# Patient Record
Sex: FEMALE | Race: WHITE | NOT HISPANIC OR LATINO | URBAN - METROPOLITAN AREA
[De-identification: names, ages, dates, MRNs, and addresses within clinical notes are randomized per-mention and may not be internally consistent; named-entity substitution may affect disease eponyms.]

---

## 2017-03-14 ENCOUNTER — APPOINTMENT (OUTPATIENT)
Dept: WOMENS IMAGING | Facility: HOSPITAL | Age: 62
End: 2017-03-14

## 2017-03-14 PROCEDURE — 77067 SCR MAMMO BI INCL CAD: CPT | Performed by: RADIOLOGY

## 2017-05-23 ENCOUNTER — ON CAMPUS - OUTPATIENT (AMBULATORY)
Dept: URBAN - METROPOLITAN AREA HOSPITAL 2 | Facility: HOSPITAL | Age: 62
End: 2017-05-23

## 2017-05-23 VITALS
DIASTOLIC BLOOD PRESSURE: 75 MMHG | SYSTOLIC BLOOD PRESSURE: 125 MMHG | DIASTOLIC BLOOD PRESSURE: 47 MMHG | HEART RATE: 82 BPM | HEART RATE: 81 BPM | SYSTOLIC BLOOD PRESSURE: 156 MMHG | SYSTOLIC BLOOD PRESSURE: 161 MMHG | DIASTOLIC BLOOD PRESSURE: 83 MMHG | OXYGEN SATURATION: 96 % | SYSTOLIC BLOOD PRESSURE: 131 MMHG | OXYGEN SATURATION: 99 % | DIASTOLIC BLOOD PRESSURE: 53 MMHG | HEART RATE: 89 BPM | DIASTOLIC BLOOD PRESSURE: 84 MMHG | HEART RATE: 90 BPM | HEART RATE: 80 BPM | HEIGHT: 65 IN | OXYGEN SATURATION: 97 % | RESPIRATION RATE: 16 BRPM | HEART RATE: 78 BPM | DIASTOLIC BLOOD PRESSURE: 108 MMHG | DIASTOLIC BLOOD PRESSURE: 89 MMHG | SYSTOLIC BLOOD PRESSURE: 70 MMHG | TEMPERATURE: 98.1 F | RESPIRATION RATE: 15 BRPM | WEIGHT: 164.8 LBS | OXYGEN SATURATION: 100 % | DIASTOLIC BLOOD PRESSURE: 90 MMHG | SYSTOLIC BLOOD PRESSURE: 143 MMHG | SYSTOLIC BLOOD PRESSURE: 129 MMHG | HEART RATE: 93 BPM

## 2017-05-23 DIAGNOSIS — Z86.010 PERSONAL HISTORY OF COLONIC POLYPS: ICD-10-CM

## 2017-05-23 PROCEDURE — 45378 DIAGNOSTIC COLONOSCOPY: CPT | Mod: 33

## 2017-05-23 RX ADMIN — PROPOFOL: 10 INJECTION, EMULSION INTRAVENOUS at 08:24

## 2018-03-15 ENCOUNTER — APPOINTMENT (OUTPATIENT)
Dept: WOMENS IMAGING | Facility: HOSPITAL | Age: 63
End: 2018-03-15

## 2018-03-15 PROCEDURE — 77067 SCR MAMMO BI INCL CAD: CPT | Performed by: RADIOLOGY

## 2018-03-15 PROCEDURE — 77063 BREAST TOMOSYNTHESIS BI: CPT | Performed by: RADIOLOGY

## 2023-03-28 ENCOUNTER — TRANSCRIBE ORDERS (OUTPATIENT)
Dept: ADMINISTRATIVE | Facility: HOSPITAL | Age: 68
End: 2023-03-28
Payer: COMMERCIAL

## 2023-03-28 DIAGNOSIS — Z13.6 ENCOUNTER FOR SCREENING FOR VASCULAR DISEASE: Primary | ICD-10-CM

## 2024-01-02 ENCOUNTER — APPOINTMENT (OUTPATIENT)
Dept: CT IMAGING | Facility: HOSPITAL | Age: 69
End: 2024-01-02

## 2024-01-02 ENCOUNTER — HOSPITAL ENCOUNTER (OUTPATIENT)
Dept: CARDIOLOGY | Facility: HOSPITAL | Age: 69
Discharge: HOME | End: 2024-01-02
Admitting: FAMILY MEDICINE

## 2024-01-02 DIAGNOSIS — Z13.6 ENCOUNTER FOR SCREENING FOR VASCULAR DISEASE: ICD-10-CM

## 2024-01-02 PROCEDURE — 93799 UNLISTED CV SVC/PROCEDURE: CPT

## 2024-01-05 LAB
BH CV VAS SCREENING CAROTID CCA LEFT: 59 CM/SEC
BH CV VAS SCREENING CAROTID CCA RIGHT: 50 CM/SEC
BH CV VAS SCREENING CAROTID ICA LEFT: 37 CM/SEC
BH CV VAS SCREENING CAROTID ICA RIGHT: 35 CM/SEC
BH CV XLRA MEAS - MID AO DIAM: 1.8 CM
BH CV XLRA MEAS - PAD LEFT ABI PT: 1.21
BH CV XLRA MEAS - PAD LEFT ARM: 118 MMHG
BH CV XLRA MEAS - PAD LEFT LEG PT: 149 MMHG
BH CV XLRA MEAS - PAD RIGHT ABI PT: 1.23
BH CV XLRA MEAS - PAD RIGHT ARM: 123 MMHG
BH CV XLRA MEAS - PAD RIGHT LEG PT: 151 MMHG
BH CV XLRA MEAS LEFT DIST CCA EDV: -23.8 CM/SEC
BH CV XLRA MEAS LEFT DIST CCA PSV: -58.6 CM/SEC
BH CV XLRA MEAS LEFT ICA/CCA RATIO: 0.6
BH CV XLRA MEAS LEFT PROX ICA EDV: -17.2 CM/SEC
BH CV XLRA MEAS LEFT PROX ICA PSV: -36.7 CM/SEC
BH CV XLRA MEAS RIGHT DIST CCA EDV: -19.9 CM/SEC
BH CV XLRA MEAS RIGHT DIST CCA PSV: -50.3 CM/SEC
BH CV XLRA MEAS RIGHT ICA/CCA RATIO: 0.7
BH CV XLRA MEAS RIGHT PROX ICA EDV: -15.7 CM/SEC
BH CV XLRA MEAS RIGHT PROX ICA PSV: -35.4 CM/SEC

## 2024-01-29 ENCOUNTER — HOSPITAL ENCOUNTER (OUTPATIENT)
Dept: CT IMAGING | Facility: HOSPITAL | Age: 69
Discharge: HOME | End: 2024-01-29
Admitting: FAMILY MEDICINE

## 2024-01-29 DIAGNOSIS — Z13.6 ENCOUNTER FOR SCREENING FOR VASCULAR DISEASE: ICD-10-CM

## 2024-01-29 PROCEDURE — 75571 CT HRT W/O DYE W/CA TEST: CPT

## 2024-01-31 RX ORDER — ESCITALOPRAM OXALATE 20 MG/1
20 TABLET ORAL DAILY
COMMUNITY

## 2024-01-31 RX ORDER — SPIRONOLACTONE 50 MG/1
TABLET, FILM COATED ORAL
COMMUNITY
Start: 2024-01-07

## 2024-01-31 NOTE — SIGNIFICANT NOTE
Education provided the Patient on the following:    - Nothing to Eat or Drink after MN the night before the procedure  -You will need to have someone drive you home after your Surgery and remain with you for 24 hours after the Procedure  - The date of your procedure, your are welcome to have one visitor at bedside or remain within 10-15 minutes of [facility]  -Please wear warm socks when you arrive for your Surgery  -Remove all jewelry and leave any valuables before arriving on the date of your procedure (all will have to be removed before leaving preop)  -You will need to arrive at 0600 on 2/5 for your Surgery  -Feel free to contact us at: [phone] with any additional questions/concerns

## 2024-02-05 ENCOUNTER — ANESTHESIA EVENT (OUTPATIENT)
Dept: SURGERY | Facility: SURGERY CENTER | Age: 69
End: 2024-02-05
Payer: MEDICARE

## 2024-02-05 ENCOUNTER — HOSPITAL ENCOUNTER (OUTPATIENT)
Facility: SURGERY CENTER | Age: 69
Setting detail: HOSPITAL OUTPATIENT SURGERY
Discharge: HOME | End: 2024-02-05
Attending: PODIATRIST | Admitting: PODIATRIST
Payer: MEDICARE

## 2024-02-05 ENCOUNTER — ANESTHESIA (OUTPATIENT)
Dept: SURGERY | Facility: SURGERY CENTER | Age: 69
End: 2024-02-05
Payer: MEDICARE

## 2024-02-05 VITALS
HEART RATE: 68 BPM | OXYGEN SATURATION: 96 % | RESPIRATION RATE: 16 BRPM | SYSTOLIC BLOOD PRESSURE: 115 MMHG | DIASTOLIC BLOOD PRESSURE: 88 MMHG | TEMPERATURE: 98 F | BODY MASS INDEX: 27.66 KG/M2 | WEIGHT: 166 LBS | HEIGHT: 65 IN

## 2024-02-05 PROCEDURE — 25010000002 MIDAZOLAM PER 1 MG: Performed by: ANESTHESIOLOGY

## 2024-02-05 PROCEDURE — 28289 CORRJ HALUX RIGDUS W/O IMPLT: CPT | Performed by: PODIATRIST

## 2024-02-05 PROCEDURE — 25010000002 DEXAMETHASONE SODIUM PHOSPHATE 20 MG/5ML SOLUTION: Performed by: ANESTHESIOLOGY

## 2024-02-05 PROCEDURE — 25010000002 CEFAZOLIN SODIUM-DEXTROSE 1-4 GM-%(50ML) RECONSTITUTED SOLUTION: Performed by: ANESTHESIOLOGY

## 2024-02-05 PROCEDURE — 25010000002 PROPOFOL 10 MG/ML EMULSION: Performed by: ANESTHESIOLOGY

## 2024-02-05 PROCEDURE — 25010000002 PROPOFOL 200 MG/20ML EMULSION: Performed by: ANESTHESIOLOGY

## 2024-02-05 PROCEDURE — 25010000002 FENTANYL CITRATE (PF) 50 MCG/ML SOLUTION: Performed by: ANESTHESIOLOGY

## 2024-02-05 PROCEDURE — 25010000002 BUPIVACAINE 0.5 % SOLUTION: Performed by: PODIATRIST

## 2024-02-05 PROCEDURE — 25810000003 LACTATED RINGERS PER 1000 ML: Performed by: ANESTHESIOLOGY

## 2024-02-05 RX ORDER — MAGNESIUM 30 MG
30 TABLET ORAL
COMMUNITY

## 2024-02-05 RX ORDER — HYDRALAZINE HYDROCHLORIDE 20 MG/ML
10 INJECTION INTRAMUSCULAR; INTRAVENOUS
Status: DISCONTINUED | OUTPATIENT
Start: 2024-02-05 | End: 2024-02-05 | Stop reason: HOSPADM

## 2024-02-05 RX ORDER — CEFAZOLIN SODIUM 1 G/50ML
1000 SOLUTION INTRAVENOUS ONCE
Status: CANCELLED | OUTPATIENT
Start: 2024-02-05

## 2024-02-05 RX ORDER — FAMOTIDINE 10 MG/ML
20 INJECTION, SOLUTION INTRAVENOUS ONCE
Status: COMPLETED | OUTPATIENT
Start: 2024-02-05 | End: 2024-02-05

## 2024-02-05 RX ORDER — FENTANYL CITRATE 50 UG/ML
25 INJECTION, SOLUTION INTRAMUSCULAR; INTRAVENOUS
Status: DISCONTINUED | OUTPATIENT
Start: 2024-02-05 | End: 2024-02-05 | Stop reason: HOSPADM

## 2024-02-05 RX ORDER — DEXAMETHASONE SODIUM PHOSPHATE 4 MG/ML
INJECTION, SOLUTION INTRA-ARTICULAR; INTRALESIONAL; INTRAMUSCULAR; INTRAVENOUS; SOFT TISSUE AS NEEDED
Status: DISCONTINUED | OUTPATIENT
Start: 2024-02-05 | End: 2024-02-05 | Stop reason: SURG

## 2024-02-05 RX ORDER — LABETALOL HYDROCHLORIDE 5 MG/ML
5 INJECTION, SOLUTION INTRAVENOUS
Status: DISCONTINUED | OUTPATIENT
Start: 2024-02-05 | End: 2024-02-05 | Stop reason: HOSPADM

## 2024-02-05 RX ORDER — SODIUM CHLORIDE 0.9 % (FLUSH) 0.9 %
10 SYRINGE (ML) INJECTION AS NEEDED
Status: DISCONTINUED | OUTPATIENT
Start: 2024-02-05 | End: 2024-02-05 | Stop reason: HOSPADM

## 2024-02-05 RX ORDER — PROPOFOL 10 MG/ML
INJECTION, EMULSION INTRAVENOUS AS NEEDED
Status: DISCONTINUED | OUTPATIENT
Start: 2024-02-05 | End: 2024-02-05 | Stop reason: SURG

## 2024-02-05 RX ORDER — LIDOCAINE HYDROCHLORIDE 20 MG/ML
INJECTION, SOLUTION EPIDURAL; INFILTRATION; INTRACAUDAL; PERINEURAL AS NEEDED
Status: DISCONTINUED | OUTPATIENT
Start: 2024-02-05 | End: 2024-02-05 | Stop reason: SURG

## 2024-02-05 RX ORDER — SULFACETAMIDE SODIUM 100 MG/ML
SOLUTION/ DROPS OPHTHALMIC
COMMUNITY
Start: 2016-03-02

## 2024-02-05 RX ORDER — MAGNESIUM HYDROXIDE 1200 MG/15ML
LIQUID ORAL AS NEEDED
Status: DISCONTINUED | OUTPATIENT
Start: 2024-02-05 | End: 2024-02-05 | Stop reason: HOSPADM

## 2024-02-05 RX ORDER — ONDANSETRON 2 MG/ML
4 INJECTION INTRAMUSCULAR; INTRAVENOUS ONCE AS NEEDED
Status: DISCONTINUED | OUTPATIENT
Start: 2024-02-05 | End: 2024-02-05 | Stop reason: HOSPADM

## 2024-02-05 RX ORDER — FENTANYL CITRATE 0.05 MG/ML
INJECTION, SOLUTION INTRAMUSCULAR; INTRAVENOUS AS NEEDED
Status: DISCONTINUED | OUTPATIENT
Start: 2024-02-05 | End: 2024-02-05 | Stop reason: SURG

## 2024-02-05 RX ORDER — ACETAMINOPHEN 500 MG
1000 TABLET ORAL ONCE
Status: COMPLETED | OUTPATIENT
Start: 2024-02-05 | End: 2024-02-05

## 2024-02-05 RX ORDER — BUPIVACAINE HYDROCHLORIDE 5 MG/ML
INJECTION, SOLUTION PERINEURAL AS NEEDED
Status: DISCONTINUED | OUTPATIENT
Start: 2024-02-05 | End: 2024-02-05 | Stop reason: HOSPADM

## 2024-02-05 RX ORDER — SODIUM CHLORIDE 0.9 % (FLUSH) 0.9 %
10 SYRINGE (ML) INJECTION EVERY 12 HOURS SCHEDULED
Status: DISCONTINUED | OUTPATIENT
Start: 2024-02-05 | End: 2024-02-05 | Stop reason: HOSPADM

## 2024-02-05 RX ORDER — MIDAZOLAM HYDROCHLORIDE 1 MG/ML
1 INJECTION INTRAMUSCULAR; INTRAVENOUS ONCE
Status: COMPLETED | OUTPATIENT
Start: 2024-02-05 | End: 2024-02-05

## 2024-02-05 RX ORDER — CEFAZOLIN SODIUM 1 G/50ML
SOLUTION INTRAVENOUS AS NEEDED
Status: DISCONTINUED | OUTPATIENT
Start: 2024-02-05 | End: 2024-02-05 | Stop reason: SURG

## 2024-02-05 RX ORDER — DICLOFENAC SODIUM 75 MG/1
75 TABLET, DELAYED RELEASE ORAL
COMMUNITY
Start: 2023-11-15

## 2024-02-05 RX ORDER — SODIUM CHLORIDE, SODIUM LACTATE, POTASSIUM CHLORIDE, CALCIUM CHLORIDE 600; 310; 30; 20 MG/100ML; MG/100ML; MG/100ML; MG/100ML
9 INJECTION, SOLUTION INTRAVENOUS CONTINUOUS PRN
Status: DISCONTINUED | OUTPATIENT
Start: 2024-02-05 | End: 2024-02-05 | Stop reason: HOSPADM

## 2024-02-05 RX ADMIN — ACETAMINOPHEN 1000 MG: 500 TABLET ORAL at 07:12

## 2024-02-05 RX ADMIN — LIDOCAINE HYDROCHLORIDE 60 MG: 20 INJECTION, SOLUTION EPIDURAL; INFILTRATION; INTRACAUDAL; PERINEURAL at 07:38

## 2024-02-05 RX ADMIN — FAMOTIDINE 20 MG: 10 INJECTION, SOLUTION INTRAVENOUS at 07:12

## 2024-02-05 RX ADMIN — SODIUM CHLORIDE, POTASSIUM CHLORIDE, SODIUM LACTATE AND CALCIUM CHLORIDE 9 ML/HR: 600; 310; 30; 20 INJECTION, SOLUTION INTRAVENOUS at 07:03

## 2024-02-05 RX ADMIN — PROPOFOL 100 MG: 10 INJECTION, EMULSION INTRAVENOUS at 07:38

## 2024-02-05 RX ADMIN — PROPOFOL 120 MCG/KG/MIN: 10 INJECTION, EMULSION INTRAVENOUS at 07:38

## 2024-02-05 RX ADMIN — CEFAZOLIN SODIUM 1 G: 1 SOLUTION INTRAVENOUS at 07:37

## 2024-02-05 RX ADMIN — DEXAMETHASONE SODIUM PHOSPHATE 8 MG: 4 INJECTION, SOLUTION INTRAMUSCULAR; INTRAVENOUS at 07:52

## 2024-02-05 RX ADMIN — FENTANYL CITRATE 50 MCG: 0.05 INJECTION, SOLUTION INTRAMUSCULAR; INTRAVENOUS at 07:36

## 2024-02-05 RX ADMIN — MIDAZOLAM 1 MG: 1 INJECTION INTRAMUSCULAR; INTRAVENOUS at 07:12

## 2024-02-05 NOTE — H&P
Patient Care Team:  Ginny Parker MD as PCP - General (Family Medicine)  Emi Birmingham MD as Consulting Physician (Obstetrics and Gynecology)    Chief complaint pain 1st MPJ L    Subjective     Patient is a 68 y.o. female presents with Pain with decreased ROM L 1st MPJ.     Review of Systems   Review of Systems   Constitutional: Negative.    HENT: Negative.     Eyes: Negative.    Respiratory: Negative.     Cardiovascular: Negative.    Gastrointestinal: Negative.    Endocrine: Negative.    Genitourinary: Negative.    Musculoskeletal: Negative.    Skin: Negative.    Allergic/Immunologic: Negative.    Neurological: Negative.    Hematological: Negative.    Psychiatric/Behavioral: Negative.         History  Past Medical History:   Diagnosis Date    Arthritis     Cancer     Sleep apnea      Past Surgical History:   Procedure Laterality Date    BILATERAL BREAST REDUCTION Bilateral     CHOLECYSTECTOMY      HYSTERECTOMY      SKIN CANCER EXCISION      on face    TONSILLECTOMY      TUBAL ABDOMINAL LIGATION       History reviewed. No pertinent family history.  Social History     Tobacco Use    Smoking status: Former     Packs/day: 1.00     Years: 12.00     Additional pack years: 0.00     Total pack years: 12.00     Types: Cigarettes     Quit date:      Years since quittin.1   Substance Use Topics    Alcohol use: Yes     Comment: socially     E-cigarette/Vaping     E-cigarette/Vaping Substances     Medications Prior to Admission   Medication Sig Dispense Refill Last Dose    diclofenac (VOLTAREN) 75 MG EC tablet 1 tablet.   2024    docusate sodium (COLACE) 50 MG capsule COLACE CAPS   Past Week    escitalopram (LEXAPRO) 20 MG tablet Take 1 tablet by mouth Daily.   2024    magnesium 30 MG tablet Take 1 tablet by mouth.   2024    spironolactone (ALDACTONE) 50 MG tablet TAKE 1 TABLET DAILY, IF WELL TOLERATED AFTER 2 WEEKS INCREASE TO 2 TABLETS DAILY   2024    sulfacetamide (Bleph-10) 10 %  ophthalmic solution BLEPH-10 10 % SOLN   2/5/2024     Allergies:  Codeine    Objective     Vital Signs  Temp:  [97.5 °F (36.4 °C)] 97.5 °F (36.4 °C)  Heart Rate:  [71] 71  Resp:  [16] 16  BP: (137)/(66) 137/66    Physical Exam:    Physical Exam  HENT:      Head: Normocephalic and atraumatic.   Cardiovascular:      Rate and Rhythm: Normal rate and regular rhythm.   Pulmonary:      Effort: Pulmonary effort is normal.      Breath sounds: Normal breath sounds.   Musculoskeletal:         General: Normal range of motion.      Cervical back: Normal range of motion and neck supple.   Feet:      Comments: Pain with decresae ROM L 1st MPJ  Skin:     General: Skin is warm and dry.      Capillary Refill: Capillary refill takes less than 2 seconds.   Neurological:      General: No focal deficit present.      Mental Status: She is alert and oriented to person, place, and time.       Results Review:   I reviewed the patient's new clinical results.  I reviewed the patient's new imaging results and agree with the interpretation.  I reviewed the patient's other test results and agree with the interpretation    Assessment & Plan       * No active hospital problems. *      **    I discussed the patients findings and my recommendations with patient.     Yann Troncoso DPM  02/05/24  07:34 EST

## 2024-02-05 NOTE — OP NOTE
CHEILECTOMY  Procedure Report    Patient Name:  Sue Chen  YOB: 1955    Date of Surgery:  2/5/2024     Indications:  Pain with ambulation    Pre-op Diagnosis:   Hallux rigidus of left foot [M20.22]       Post-Op Diagnosis Codes:     * Hallux rigidus of left foot [M20.22]    Procedure/CPT® Codes:      Procedure(s):  HALLUX RIGIDUS CORRECTION LEFT    Staff:  Surgeon(s):  Yann Troncoso DPM         Anesthesia: MAC with Local    Estimated Blood Loss: Minimal    Implants:    Nothing was implanted during the procedure    Specimen:          None        Findings: See Op report    Complications: None    Description of Procedure: The patient was brought into the operating room and placed on the operating room table in a supine position the patient was then placed under monitored anesthesia. A local block consisting of 20cc of a 1 to 1 mixture of 1% lidocaine plain and 0.5% Marcaine plain was then locally infiltrated.   The foot was then scrubbed prepped and draped in the usual aseptic manner. The foot was exsanguinated and the pneumatic ankle tourniquet was then inflated. Our attention was then directed to the left foot where a 5 cm dorsal linear incision was created over the first MPJ. The incision was then deepened through the subcutaneous tissue all vital neural and vascular structures were retractedm, all bleeders were cauterized and ligated as necessary. A linear type capsulotomy was then performed. The head of the first metatarsal was then exposed. The medial, dorsal and lateral spurring were then resected using a sagittal bone saw. The ROM was assesed and no further impingement was noted.  The area was then flushed with copious amounts of normal Saline solution. The capsule was then repaired with 3-0 Vicryl the skin was repaired with 5-0 proline. The area was then dressed with a dry sterile dressing. The pneumatic ankle tourniquet was released and a promptVascular response was noted in the digits.  Patient will be allowed to partial weight bear with a postoperative shoe and follow up with myself in the office in one week.          Yann Troncoso DPM     Date: 2/5/2024  Time: 07:35 EST

## 2024-02-05 NOTE — ANESTHESIA PREPROCEDURE EVALUATION
Anesthesia Evaluation     no history of anesthetic complications:   NPO Solid Status: > 8 hours  NPO Liquid Status: > 2 hours           Airway   Mallampati: II  Neck ROM: full  no difficulty expected  Dental - normal exam     Pulmonary - normal exam   (+) a smoker Former,sleep apnea  (-) COPD, asthma    PE comment: nonlabored  Cardiovascular - negative cardio ROS and normal exam  Exercise tolerance: good (4-7 METS)    Rhythm: regular  Rate: normal    (-) hypertension, valvular problems/murmurs, past MI, CAD, dysrhythmias, angina      Neuro/Psych- negative ROS  (-) seizures, TIA, CVA  GI/Hepatic/Renal/Endo - negative ROS   (-) GERD, liver disease, no renal disease, diabetes, no thyroid disorder    Musculoskeletal     (+) arthralgias  Abdominal    Substance History      OB/GYN          Other   arthritis,   history of cancer (Skin cancer)                Anesthesia Plan    ASA 2     MAC       Anesthetic plan, risks, benefits, and alternatives have been provided, discussed and informed consent has been obtained with: patient.    CODE STATUS:

## 2024-02-05 NOTE — ANESTHESIA POSTPROCEDURE EVALUATION
"Patient: Sue Chen    Procedure Summary       Date: 02/05/24 Room / Location: [facility]    Anesthesia Start: 0734 Anesthesia Stop: 0809    Procedure: HALLUX RIGIDUS CORRECTION LEFT (Left) Diagnosis:       Hallux rigidus of left foot      (Hallux rigidus of left foot [M20.22])    Surgeons: Yann Troncoso DPM Provider: Glenn Braga MD    Anesthesia Type: MAC ASA Status: 2            Anesthesia Type: MAC    Vitals  Vitals Value Taken Time   /83 02/05/24 0814   Temp 36.7 °C (98 °F) 02/05/24 0809   Pulse 72 02/05/24 0814   Resp 16 02/05/24 0813   SpO2 95 % 02/05/24 0814   Vitals shown include unfiled device data.        Post Anesthesia Care and Evaluation    Patient location during evaluation: bedside  Patient participation: complete - patient participated  Level of consciousness: sleepy but conscious  Pain management: adequate    Airway patency: patent  Anesthetic complications: No anesthetic complications    Cardiovascular status: acceptable  Respiratory status: acceptable  Hydration status: acceptable    Comments: */83   Pulse 76   Temp 36.7 °C (98 °F)   Resp 16   Ht 165.1 cm (65\")   Wt 75.3 kg (166 lb)   SpO2 95%   BMI 27.62 kg/m²       "

## 2024-02-05 NOTE — OP NOTE
CHEILECTOMY  Procedure Report    Patient Name:  Sue Chen  YOB: 1955    Date of Surgery:  2/5/2024     Indications:  Pain with ambulation    Pre-op Diagnosis:   Hallux rigidus of left foot [M20.22]       Post-Op Diagnosis Codes:     * Hallux rigidus of left foot [M20.22]    Procedure/CPT® Codes:      Procedure(s):  HALLUX RIGIDUS CORRECTION LEFT    Staff:  Surgeon(s):  Yann Troncoso DPM         Anesthesia: MAC with Local    Estimated Blood Loss: Minimal    Implants:    Nothing was implanted during the procedure    Specimen:          None        Findings: See Op report    Complications: None    Description of Procedure: The patient was brought into the operating room and placed on the operating room table in a supine position the patient was then placed under monitored anesthesia. A local block consisting of 20cc of a 1 to 1 mixture of 1% lidocaine plain and 0.5% Marcaine plain was then locally infiltrated.   The foot was then scrubbed prepped and draped in the usual aseptic manner. The foot was exsanguinated and the pneumatic ankle tourniquet was then inflated. Our attention was then directed to the left foot where a 5 cm dorsal linear incision was created over the first MPJ. The incision was then deepened through the subcutaneous tissue all vital neural and vascular structures were retractedm, all bleeders were cauterized and ligated as necessary. A linear type capsulotomy was then performed. The head of the first metatarsal was then exposed. The medial, dorsal and lateral spurring were then resected using a sagittal bone saw. The ROM was assesed and no further impingement was noted.  The area was then flushed with copious amounts of normal Saline solution. The capsule was then repaired with 3-0 Vicryl the skin was repaired with 5-0 proline. The area was then dressed with a dry sterile dressing. The pneumatic ankle tourniquet was released and a promptVascular response was noted in the digits.  Patient will be allowed to partial weight bear with a postoperative shoe and follow up with myself in the office in one week.          Yann Troncoso DPM     Date: 2/5/2024  Time: 08:04 EST

## 2024-03-06 ENCOUNTER — OFFICE VISIT (OUTPATIENT)
Dept: CARDIAC SURGERY | Facility: CLINIC | Age: 69
End: 2024-03-06
Payer: MEDICARE

## 2024-03-06 VITALS
RESPIRATION RATE: 16 BRPM | DIASTOLIC BLOOD PRESSURE: 86 MMHG | BODY MASS INDEX: 28.06 KG/M2 | OXYGEN SATURATION: 98 % | HEIGHT: 65 IN | WEIGHT: 168.4 LBS | SYSTOLIC BLOOD PRESSURE: 150 MMHG | HEART RATE: 86 BPM

## 2024-03-06 DIAGNOSIS — R03.0 ELEVATED BLOOD PRESSURE READING: ICD-10-CM

## 2024-03-06 DIAGNOSIS — Z82.49 FAMILY HISTORY OF THORACIC AORTIC ANEURYSM: ICD-10-CM

## 2024-03-06 DIAGNOSIS — I71.21 ANEURYSM OF ASCENDING AORTA WITHOUT RUPTURE: Primary | ICD-10-CM

## 2024-03-06 DIAGNOSIS — G47.33 OSA ON CPAP: ICD-10-CM

## 2024-03-06 PROCEDURE — 1159F MED LIST DOCD IN RCRD: CPT | Performed by: NURSE PRACTITIONER

## 2024-03-06 PROCEDURE — 99203 OFFICE O/P NEW LOW 30 MIN: CPT | Performed by: NURSE PRACTITIONER

## 2024-03-06 PROCEDURE — 1160F RVW MEDS BY RX/DR IN RCRD: CPT | Performed by: NURSE PRACTITIONER

## 2024-03-06 NOTE — LETTER
March 6, 2024     Ginny Parker MD  [address] [facility] [place] [address]    Patient: Sue Chen   YOB: 1955   Date of Visit: 3/6/2024     Dear Ginny Parker MD:       Thank you for referring Sue Chen to me for evaluation. Below are the relevant portions of my assessment and plan of care.    If you have questions, please do not hesitate to call me. I look forward to following Sue along with you.         Sincerely,        FRANNY Recinos        CC: MD Iliana Arreola Tabitha L, APRN  03/06/24 1631  Sign when Signing Visit  3/6/2024      Subjective:      Ginny Parker MD    Chief Complaint: evaluation of thoracic aneurysm    History of Present Illness:       Dear Ginny Levy MD and Colleagues,    It was nice to see Sue Chen in [facility] today. She is a 68 y.o. female with known family hx of ascending aortic aneurysm, LILI with CPAP compliance, and former tobacco abuse.  Her ascending aortic aneurysm was originally discovered during a coronary calcium screening scan.  Her calcium score was zero.  She comes in today for routine follow-up for aneurysm surveillence.  The coronary calcium score on 1/29/2024 was reviewed.  The aortic root measures was not measured, ascending aorta measures 4.5-4.6 cm, and DTA measures 2.9-3 cm.  There are no other scans to compare with.  She denies shortness of breath, chest/back pain, numbness/tingling/pain of extremities.  No vascular deficiencies or hyperextensible or hypermobile joints are noted on exam.  The patient's family history is positive for thoracic aortic aneurysms.  Her sister is seen by our office as well.  Family hx is negative for aortic dissections, negative for connective tissue disease, and negative for coronary disease in first degree family members.  Her BP today is 150/86.  She reports her BP is elevated d/t stress of this appt.   She is with her daughter for the appt  today.        Patient Active Problem List   Diagnosis   • Aneurysm of ascending aorta without rupture   • Elevated blood pressure reading   • LILI on CPAP   • Family history of thoracic aortic aneurysm       Past Medical History:   Diagnosis Date   • Arthritis    • Cancer    • Sleep apnea        Past Surgical History:   Procedure Laterality Date   • BILATERAL BREAST REDUCTION Bilateral    • CHEILECTOMY Left 2024    Procedure: HALLUX RIGIDUS CORRECTION LEFT;  Surgeon: Yann Troncoso DPM;  Location: [facility] MAIN OR;  Service: Podiatry;  Laterality: Left;   • CHOLECYSTECTOMY     • HYSTERECTOMY     • SKIN CANCER EXCISION      on face   • TONSILLECTOMY     • TOTAL ABDOMINAL HYSTERECTOMY WITH SALPINGO OOPHORECTOMY Bilateral    • TUBAL ABDOMINAL LIGATION         Allergies   Allergen Reactions   • Codeine Nausea Only         Current Outpatient Medications:   •  docusate sodium (COLACE) 50 MG capsule, COLACE CAPS, Disp: , Rfl:   •  escitalopram (LEXAPRO) 20 MG tablet, Take 1 tablet by mouth Daily., Disp: , Rfl:   •  magnesium 30 MG tablet, Take 1 tablet by mouth., Disp: , Rfl:   •  spironolactone (ALDACTONE) 50 MG tablet, TAKE 1 TABLET DAILY, IF WELL TOLERATED AFTER 2 WEEKS INCREASE TO 2 TABLETS DAILY, Disp: , Rfl:   •  sulfacetamide (Bleph-10) 10 % ophthalmic solution, BLEPH-10 10 % SOLN, Disp: , Rfl:   •  diclofenac (VOLTAREN) 75 MG EC tablet, 1 tablet., Disp: , Rfl:     Social History     Socioeconomic History   • Marital status:    Tobacco Use   • Smoking status: Former     Current packs/day: 0.00     Average packs/day: 1 pack/day for 12.0 years (12.0 ttl pk-yrs)     Types: Cigarettes     Start date:      Quit date:      Years since quittin.2   Substance and Sexual Activity   • Alcohol use: Yes     Comment: socially       No family history on file.        Review of Systems:  Review of Systems   Respiratory:  Negative for shortness of breath.    Cardiovascular:  Negative for chest pain,  palpitations and leg swelling.   Musculoskeletal:  Positive for arthralgias.   Neurological:  Negative for dizziness and light-headedness.       Physical Exam:    Vital Signs:  Weight: 76.4 kg (168 lb 6.4 oz)   Body mass index is 28.02 kg/m².      Heart Rate: 86   BP: 150/86     Physical Exam  Vitals and nursing note reviewed.   Constitutional:       General: She is awake.      Appearance: Normal appearance. She is well-developed and well-groomed.   HENT:      Head: Normocephalic and atraumatic.      Nose: Nose normal.      Mouth/Throat:      Lips: Pink.      Mouth: Mucous membranes are moist.      Pharynx: Uvula midline.   Eyes:      General: Lids are normal. No scleral icterus.     Extraocular Movements: Extraocular movements intact.      Conjunctiva/sclera: Conjunctivae normal.      Pupils: Pupils are equal, round, and reactive to light.   Neck:      Thyroid: No thyroid mass or thyromegaly.      Vascular: Normal carotid pulses. No carotid bruit, hepatojugular reflux or JVD.      Trachea: Trachea normal.   Cardiovascular:      Rate and Rhythm: Normal rate and regular rhythm.      Pulses:           Carotid pulses are 2+ on the right side and 2+ on the left side.       Radial pulses are 2+ on the right side and 2+ on the left side.        Femoral pulses are 2+ on the right side and 2+ on the left side.       Popliteal pulses are 2+ on the right side and 2+ on the left side.        Dorsalis pedis pulses are 2+ on the right side and 2+ on the left side.        Posterior tibial pulses are 2+ on the right side and 2+ on the left side.      Heart sounds: Normal heart sounds. No murmur heard.  Pulmonary:      Effort: Pulmonary effort is normal.      Breath sounds: Normal breath sounds.   Abdominal:      General: Abdomen is protuberant. Bowel sounds are normal. There is no distension.      Palpations: Abdomen is soft.      Tenderness: There is no abdominal tenderness.   Musculoskeletal:      Cervical back: Neck supple.       Right lower leg: No edema.      Left lower leg: No edema.      Comments: Gait steady and strong without use of assistive devices   Lymphadenopathy:      Cervical: No cervical adenopathy.      Upper Body:      Right upper body: No supraclavicular adenopathy.      Left upper body: No supraclavicular adenopathy.   Skin:     General: Skin is warm and dry.      Capillary Refill: Capillary refill takes less than 2 seconds.      Findings: No erythema or rash.      Nails: There is no clubbing.   Neurological:      Mental Status: She is alert and oriented to person, place, and time.      GCS: GCS eye subscore is 4. GCS verbal subscore is 5. GCS motor subscore is 6.   Psychiatric:         Attention and Perception: Attention and perception normal.         Mood and Affect: Mood and affect normal.         Speech: Speech normal.         Behavior: Behavior normal. Behavior is cooperative.         Thought Content: Thought content normal.         Cognition and Memory: Cognition and memory normal.         Judgment: Judgment normal.          Assessment:     Ascending aortic aneurysm, 4.6 cm--recheck in 6 months  Elevated BP reading today--pt reports r/t stress, 124/76  Family hx of thoracic aneurysm--sister  Former tobacco abuse  LILI with CPAP compliance    Recommendation/Plan:       Continued risk factor modification of hypertension with a goal blood pressure less than 130/80, hyperlipidemia optimization, and continued avoidance of tobacco/nicotine products.    We discussed the importance of avoidance of over the counter decongestants and stimulants, specifically pseudoephedrine, for hypertension and aneurysm management.    Initiation of low aerobic exercise is indicated to help reduce body habitus, assist with blood pressure and cholesterol control.       Although risk of rupture is low, emergency department presentation is warranted for acute chest, back, or abdominal pain, syncope, or stroke like symptoms.    Follow up in [facility] in 6 months with CT scan of chest without contrast to establish stability.  She has an upcoming shoulder surgery and we discussed this in regards to her aortic aneurysm. There is no contra-indication to having her planned surgery.  I reviewed other BPs in her chart, and it is usually not as high as it is today.  She will continue to watch her BPs.    I spent approximately 35 minutes total in evaluating the data, examining the patient, discussing the options and counseling.  Independent interpretation of imaging.  Imaging shown to pt/daughter.     Thank you for allowing us to participate in her care.    Regards,    Blank Barrientos, APRN      **All problems and history are new to this examiner.  Extensive review of records prior to and during patient visit

## 2024-03-06 NOTE — PROGRESS NOTES
3/6/2024      Subjective:      Ginny Parker MD    Chief Complaint: evaluation of thoracic aneurysm    History of Present Illness:       Dear Ginny Levy MD and Colleagues,    It was nice to see Sue Chen in [facility] today. She is a 68 y.o. female with known family hx of ascending aortic aneurysm, LILI with CPAP compliance, and former tobacco abuse.  Her ascending aortic aneurysm was originally discovered during a coronary calcium screening scan.  Her calcium score was zero.  She comes in today for routine follow-up for aneurysm surveillence.  The coronary calcium score on 1/29/2024 was reviewed.  The aortic root measures was not measured, ascending aorta measures 4.5-4.6 cm, and DTA measures 2.9-3 cm.  There are no other scans to compare with.  She denies shortness of breath, chest/back pain, numbness/tingling/pain of extremities.  No vascular deficiencies or hyperextensible or hypermobile joints are noted on exam.  The patient's family history is positive for thoracic aortic aneurysms.  Her sister is seen by our office as well.  Family hx is negative for aortic dissections, negative for connective tissue disease, and negative for coronary disease in first degree family members.  Her BP today is 150/86.  She reports her BP is elevated d/t stress of this appt.   She is with her daughter for the appt today.        Patient Active Problem List   Diagnosis    Aneurysm of ascending aorta without rupture    Elevated blood pressure reading    LILI on CPAP    Family history of thoracic aortic aneurysm       Past Medical History:   Diagnosis Date    Arthritis     Cancer     Sleep apnea        Past Surgical History:   Procedure Laterality Date    BILATERAL BREAST REDUCTION Bilateral     CHEILECTOMY Left 02/05/2024    Procedure: HALLUX RIGIDUS CORRECTION LEFT;  Surgeon: Yann Troncoso DPM;  Location: [facility] [place] MAIN OR;  Service: Podiatry;  Laterality: Left;    CHOLECYSTECTOMY      HYSTERECTOMY      SKIN  CANCER EXCISION      on face    TONSILLECTOMY      TOTAL ABDOMINAL HYSTERECTOMY WITH SALPINGO OOPHORECTOMY Bilateral     TUBAL ABDOMINAL LIGATION         Allergies   Allergen Reactions    Codeine Nausea Only         Current Outpatient Medications:     docusate sodium (COLACE) 50 MG capsule, COLACE CAPS, Disp: , Rfl:     escitalopram (LEXAPRO) 20 MG tablet, Take 1 tablet by mouth Daily., Disp: , Rfl:     magnesium 30 MG tablet, Take 1 tablet by mouth., Disp: , Rfl:     spironolactone (ALDACTONE) 50 MG tablet, TAKE 1 TABLET DAILY, IF WELL TOLERATED AFTER 2 WEEKS INCREASE TO 2 TABLETS DAILY, Disp: , Rfl:     sulfacetamide (Bleph-10) 10 % ophthalmic solution, BLEPH-10 10 % SOLN, Disp: , Rfl:     diclofenac (VOLTAREN) 75 MG EC tablet, 1 tablet., Disp: , Rfl:     Social History     Socioeconomic History    Marital status:    Tobacco Use    Smoking status: Former     Current packs/day: 0.00     Average packs/day: 1 pack/day for 12.0 years (12.0 ttl pk-yrs)     Types: Cigarettes     Start date:      Quit date:      Years since quittin.2   Substance and Sexual Activity    Alcohol use: Yes     Comment: socially       No family history on file.        Review of Systems:  Review of Systems   Respiratory:  Negative for shortness of breath.    Cardiovascular:  Negative for chest pain, palpitations and leg swelling.   Musculoskeletal:  Positive for arthralgias.   Neurological:  Negative for dizziness and light-headedness.       Physical Exam:    Vital Signs:  Weight: 76.4 kg (168 lb 6.4 oz)   Body mass index is 28.02 kg/m².      Heart Rate: 86   BP: 150/86     Physical Exam  Vitals and nursing note reviewed.   Constitutional:       General: She is awake.      Appearance: Normal appearance. She is well-developed and well-groomed.   HENT:      Head: Normocephalic and atraumatic.      Nose: Nose normal.      Mouth/Throat:      Lips: Pink.      Mouth: Mucous membranes are moist.      Pharynx: Uvula midline.    Eyes:      General: Lids are normal. No scleral icterus.     Extraocular Movements: Extraocular movements intact.      Conjunctiva/sclera: Conjunctivae normal.      Pupils: Pupils are equal, round, and reactive to light.   Neck:      Thyroid: No thyroid mass or thyromegaly.      Vascular: Normal carotid pulses. No carotid bruit, hepatojugular reflux or JVD.      Trachea: Trachea normal.   Cardiovascular:      Rate and Rhythm: Normal rate and regular rhythm.      Pulses:           Carotid pulses are 2+ on the right side and 2+ on the left side.       Radial pulses are 2+ on the right side and 2+ on the left side.        Femoral pulses are 2+ on the right side and 2+ on the left side.       Popliteal pulses are 2+ on the right side and 2+ on the left side.        Dorsalis pedis pulses are 2+ on the right side and 2+ on the left side.        Posterior tibial pulses are 2+ on the right side and 2+ on the left side.      Heart sounds: Normal heart sounds. No murmur heard.  Pulmonary:      Effort: Pulmonary effort is normal.      Breath sounds: Normal breath sounds.   Abdominal:      General: Abdomen is protuberant. Bowel sounds are normal. There is no distension.      Palpations: Abdomen is soft.      Tenderness: There is no abdominal tenderness.   Musculoskeletal:      Cervical back: Neck supple.      Right lower leg: No edema.      Left lower leg: No edema.      Comments: Gait steady and strong without use of assistive devices   Lymphadenopathy:      Cervical: No cervical adenopathy.      Upper Body:      Right upper body: No supraclavicular adenopathy.      Left upper body: No supraclavicular adenopathy.   Skin:     General: Skin is warm and dry.      Capillary Refill: Capillary refill takes less than 2 seconds.      Findings: No erythema or rash.      Nails: There is no clubbing.   Neurological:      Mental Status: She is alert and oriented to person, place, and time.      GCS: GCS eye subscore is 4. GCS verbal  subscore is 5. GCS motor subscore is 6.   Psychiatric:         Attention and Perception: Attention and perception normal.         Mood and Affect: Mood and affect normal.         Speech: Speech normal.         Behavior: Behavior normal. Behavior is cooperative.         Thought Content: Thought content normal.         Cognition and Memory: Cognition and memory normal.         Judgment: Judgment normal.          Assessment:     Ascending aortic aneurysm, 4.6 cm--recheck in 6 months  Elevated BP reading today--pt reports r/t stress, 124/76  Family hx of thoracic aneurysm--sister  Former tobacco abuse  LILI with CPAP compliance    Recommendation/Plan:       Continued risk factor modification of hypertension with a goal blood pressure less than 130/80, hyperlipidemia optimization, and continued avoidance of tobacco/nicotine products.    We discussed the importance of avoidance of over the counter decongestants and stimulants, specifically pseudoephedrine, for hypertension and aneurysm management.    Initiation of low aerobic exercise is indicated to help reduce body habitus, assist with blood pressure and cholesterol control.       Although risk of rupture is low, emergency department presentation is warranted for acute chest, back, or abdominal pain, syncope, or stroke like symptoms.    Follow up in [facility] in 6 months with CT scan of chest without contrast to establish stability.  She has an upcoming shoulder surgery and we discussed this in regards to her aortic aneurysm. There is no contra-indication to having her planned surgery.  I reviewed other BPs in her chart, and it is usually not as high as it is today.  She will continue to watch her BPs.    I spent approximately 35 minutes total in evaluating the data, examining the patient, discussing the options and counseling.  Independent interpretation of imaging.  Imaging shown to pt/daughter.     Thank you for allowing us to participate in her  care.    Regards,    Blank Barrientos, APRN      **All problems and history are new to this examiner.  Extensive review of records prior to and during patient visit

## 2024-03-11 ENCOUNTER — APPOINTMENT (OUTPATIENT)
Dept: GENERAL RADIOLOGY | Facility: HOSPITAL | Age: 69
End: 2024-03-11
Payer: MEDICARE

## 2024-03-11 ENCOUNTER — HOSPITAL ENCOUNTER (OUTPATIENT)
Facility: HOSPITAL | Age: 69
Setting detail: OBSERVATION
Discharge: HOME HEALTH | End: 2024-03-12
Attending: INTERNAL MEDICINE | Admitting: INTERNAL MEDICINE
Payer: MEDICARE

## 2024-03-11 ENCOUNTER — APPOINTMENT (OUTPATIENT)
Dept: CT IMAGING | Facility: HOSPITAL | Age: 69
End: 2024-03-11
Payer: MEDICARE

## 2024-03-11 DIAGNOSIS — R42 DIZZINESS: Primary | ICD-10-CM

## 2024-03-11 LAB
ABO GROUP BLD: NORMAL
ALBUMIN SERPL-MCNC: 4.2 G/DL (ref 3.5–5.2)
ALBUMIN/GLOB SERPL: 1.6 G/DL
ALP SERPL-CCNC: 59 U/L (ref 39–117)
ALT SERPL W P-5'-P-CCNC: 17 U/L (ref 1–33)
ANION GAP SERPL CALCULATED.3IONS-SCNC: 12 MMOL/L (ref 5–15)
APTT PPP: <20 SECONDS (ref 24–31)
AST SERPL-CCNC: 16 U/L (ref 1–32)
BASOPHILS # BLD AUTO: 0 10*3/MM3 (ref 0–0.2)
BASOPHILS NFR BLD AUTO: 0.8 % (ref 0–1.5)
BILIRUB SERPL-MCNC: 0.2 MG/DL (ref 0–1.2)
BLD GP AB SCN SERPL QL: NEGATIVE
BUN SERPL-MCNC: 17 MG/DL (ref 8–23)
BUN/CREAT SERPL: 21.5 (ref 7–25)
CALCIUM SPEC-SCNC: 9.8 MG/DL (ref 8.6–10.5)
CHLORIDE SERPL-SCNC: 103 MMOL/L (ref 98–107)
CHOLEST SERPL-MCNC: 195 MG/DL (ref 0–200)
CO2 SERPL-SCNC: 25 MMOL/L (ref 22–29)
CREAT SERPL-MCNC: 0.79 MG/DL (ref 0.57–1)
DEPRECATED RDW RBC AUTO: 45.5 FL (ref 37–54)
EGFRCR SERPLBLD CKD-EPI 2021: 82.1 ML/MIN/1.73
EOSINOPHIL # BLD AUTO: 0.1 10*3/MM3 (ref 0–0.4)
EOSINOPHIL NFR BLD AUTO: 1.9 % (ref 0.3–6.2)
ERYTHROCYTE [DISTWIDTH] IN BLOOD BY AUTOMATED COUNT: 14.2 % (ref 12.3–15.4)
GLOBULIN UR ELPH-MCNC: 2.6 GM/DL
GLUCOSE BLDC GLUCOMTR-MCNC: 118 MG/DL (ref 70–105)
GLUCOSE SERPL-MCNC: 112 MG/DL (ref 65–99)
HBA1C MFR BLD: 5.7 % (ref 4.8–5.6)
HCT VFR BLD AUTO: 40.5 % (ref 34–46.6)
HDLC SERPL-MCNC: 51 MG/DL (ref 40–60)
HGB BLD-MCNC: 13.4 G/DL (ref 12–15.9)
HOLD SPECIMEN: NORMAL
HOLD SPECIMEN: NORMAL
INR PPP: 1.09 (ref 0.93–1.1)
LDLC SERPL CALC-MCNC: 130 MG/DL (ref 0–100)
LDLC/HDLC SERPL: 2.53 {RATIO}
LYMPHOCYTES # BLD AUTO: 1.3 10*3/MM3 (ref 0.7–3.1)
LYMPHOCYTES NFR BLD AUTO: 20.1 % (ref 19.6–45.3)
MCH RBC QN AUTO: 28.5 PG (ref 26.6–33)
MCHC RBC AUTO-ENTMCNC: 33 G/DL (ref 31.5–35.7)
MCV RBC AUTO: 86.5 FL (ref 79–97)
MONOCYTES # BLD AUTO: 0.4 10*3/MM3 (ref 0.1–0.9)
MONOCYTES NFR BLD AUTO: 6.4 % (ref 5–12)
NEUTROPHILS NFR BLD AUTO: 4.5 10*3/MM3 (ref 1.7–7)
NEUTROPHILS NFR BLD AUTO: 70.8 % (ref 42.7–76)
NRBC BLD AUTO-RTO: 0.1 /100 WBC (ref 0–0.2)
PLATELET # BLD AUTO: 189 10*3/MM3 (ref 140–450)
PMV BLD AUTO: 9.8 FL (ref 6–12)
POTASSIUM SERPL-SCNC: 4.3 MMOL/L (ref 3.5–5.2)
PROT SERPL-MCNC: 6.8 G/DL (ref 6–8.5)
PROTHROMBIN TIME: 11.8 SECONDS (ref 9.6–11.7)
RBC # BLD AUTO: 4.69 10*6/MM3 (ref 3.77–5.28)
RH BLD: NEGATIVE
SODIUM SERPL-SCNC: 140 MMOL/L (ref 136–145)
T&S EXPIRATION DATE: NORMAL
TRIGL SERPL-MCNC: 74 MG/DL (ref 0–150)
TROPONIN T SERPL HS-MCNC: 9 NG/L
VLDLC SERPL-MCNC: 14 MG/DL (ref 5–40)
WBC NRBC COR # BLD AUTO: 6.4 10*3/MM3 (ref 3.4–10.8)
WHOLE BLOOD HOLD COAG: NORMAL
WHOLE BLOOD HOLD SPECIMEN: NORMAL

## 2024-03-11 PROCEDURE — 25510000001 IOPAMIDOL PER 1 ML: Performed by: NURSE PRACTITIONER

## 2024-03-11 PROCEDURE — 80053 COMPREHEN METABOLIC PANEL: CPT | Performed by: NURSE PRACTITIONER

## 2024-03-11 PROCEDURE — 82948 REAGENT STRIP/BLOOD GLUCOSE: CPT | Performed by: NURSE PRACTITIONER

## 2024-03-11 PROCEDURE — G0378 HOSPITAL OBSERVATION PER HR: HCPCS

## 2024-03-11 PROCEDURE — 85730 THROMBOPLASTIN TIME PARTIAL: CPT | Performed by: NURSE PRACTITIONER

## 2024-03-11 PROCEDURE — 86900 BLOOD TYPING SEROLOGIC ABO: CPT

## 2024-03-11 PROCEDURE — 97162 PT EVAL MOD COMPLEX 30 MIN: CPT

## 2024-03-11 PROCEDURE — 80061 LIPID PANEL: CPT | Performed by: INTERNAL MEDICINE

## 2024-03-11 PROCEDURE — 99285 EMERGENCY DEPT VISIT HI MDM: CPT

## 2024-03-11 PROCEDURE — 71045 X-RAY EXAM CHEST 1 VIEW: CPT

## 2024-03-11 PROCEDURE — 85610 PROTHROMBIN TIME: CPT | Performed by: NURSE PRACTITIONER

## 2024-03-11 PROCEDURE — 83036 HEMOGLOBIN GLYCOSYLATED A1C: CPT | Performed by: INTERNAL MEDICINE

## 2024-03-11 PROCEDURE — 82607 VITAMIN B-12: CPT | Performed by: NURSE PRACTITIONER

## 2024-03-11 PROCEDURE — 93005 ELECTROCARDIOGRAM TRACING: CPT | Performed by: NURSE PRACTITIONER

## 2024-03-11 PROCEDURE — 86901 BLOOD TYPING SEROLOGIC RH(D): CPT

## 2024-03-11 PROCEDURE — 86900 BLOOD TYPING SEROLOGIC ABO: CPT | Performed by: NURSE PRACTITIONER

## 2024-03-11 PROCEDURE — 84484 ASSAY OF TROPONIN QUANT: CPT | Performed by: NURSE PRACTITIONER

## 2024-03-11 PROCEDURE — 86850 RBC ANTIBODY SCREEN: CPT | Performed by: NURSE PRACTITIONER

## 2024-03-11 PROCEDURE — 85025 COMPLETE CBC W/AUTO DIFF WBC: CPT | Performed by: NURSE PRACTITIONER

## 2024-03-11 PROCEDURE — 86901 BLOOD TYPING SEROLOGIC RH(D): CPT | Performed by: NURSE PRACTITIONER

## 2024-03-11 PROCEDURE — 70450 CT HEAD/BRAIN W/O DYE: CPT

## 2024-03-11 PROCEDURE — 70496 CT ANGIOGRAPHY HEAD: CPT

## 2024-03-11 PROCEDURE — 36415 COLL VENOUS BLD VENIPUNCTURE: CPT

## 2024-03-11 PROCEDURE — 25010000002 ONDANSETRON PER 1 MG: Performed by: NURSE PRACTITIONER

## 2024-03-11 PROCEDURE — 84443 ASSAY THYROID STIM HORMONE: CPT | Performed by: NURSE PRACTITIONER

## 2024-03-11 PROCEDURE — 70498 CT ANGIOGRAPHY NECK: CPT

## 2024-03-11 PROCEDURE — 96374 THER/PROPH/DIAG INJ IV PUSH: CPT

## 2024-03-11 RX ORDER — SODIUM CHLORIDE 0.9 % (FLUSH) 0.9 %
10 SYRINGE (ML) INJECTION AS NEEDED
Status: DISCONTINUED | OUTPATIENT
Start: 2024-03-11 | End: 2024-03-12 | Stop reason: HOSPADM

## 2024-03-11 RX ORDER — ONDANSETRON 2 MG/ML
4 INJECTION INTRAMUSCULAR; INTRAVENOUS EVERY 6 HOURS PRN
Status: DISCONTINUED | OUTPATIENT
Start: 2024-03-11 | End: 2024-03-12 | Stop reason: HOSPADM

## 2024-03-11 RX ORDER — CARBOXYMETHYLCELLULOSE SODIUM 10 MG/ML
1 GEL OPHTHALMIC 3 TIMES DAILY PRN
COMMUNITY

## 2024-03-11 RX ORDER — ACETAMINOPHEN 325 MG/1
650 TABLET ORAL EVERY 6 HOURS PRN
Status: DISCONTINUED | OUTPATIENT
Start: 2024-03-11 | End: 2024-03-12 | Stop reason: HOSPADM

## 2024-03-11 RX ORDER — ONDANSETRON 2 MG/ML
4 INJECTION INTRAMUSCULAR; INTRAVENOUS ONCE
Status: COMPLETED | OUTPATIENT
Start: 2024-03-11 | End: 2024-03-11

## 2024-03-11 RX ORDER — SODIUM CHLORIDE 9 MG/ML
100 INJECTION, SOLUTION INTRAVENOUS CONTINUOUS
Status: DISCONTINUED | OUTPATIENT
Start: 2024-03-11 | End: 2024-03-12

## 2024-03-11 RX ORDER — MECLIZINE HYDROCHLORIDE 25 MG/1
25 TABLET ORAL ONCE
Status: COMPLETED | OUTPATIENT
Start: 2024-03-11 | End: 2024-03-11

## 2024-03-11 RX ORDER — ESCITALOPRAM OXALATE 10 MG/1
20 TABLET ORAL DAILY
Status: DISCONTINUED | OUTPATIENT
Start: 2024-03-12 | End: 2024-03-12 | Stop reason: HOSPADM

## 2024-03-11 RX ORDER — ATORVASTATIN CALCIUM 40 MG/1
80 TABLET, FILM COATED ORAL NIGHTLY
Status: DISCONTINUED | OUTPATIENT
Start: 2024-03-11 | End: 2024-03-12 | Stop reason: HOSPADM

## 2024-03-11 RX ORDER — SODIUM CHLORIDE 0.9 % (FLUSH) 0.9 %
10 SYRINGE (ML) INJECTION EVERY 12 HOURS SCHEDULED
Status: DISCONTINUED | OUTPATIENT
Start: 2024-03-11 | End: 2024-03-12 | Stop reason: HOSPADM

## 2024-03-11 RX ORDER — SODIUM CHLORIDE 9 MG/ML
40 INJECTION, SOLUTION INTRAVENOUS AS NEEDED
Status: DISCONTINUED | OUTPATIENT
Start: 2024-03-11 | End: 2024-03-12 | Stop reason: HOSPADM

## 2024-03-11 RX ORDER — MECLIZINE HYDROCHLORIDE 25 MG/1
25 TABLET ORAL 3 TIMES DAILY PRN
Status: DISCONTINUED | OUTPATIENT
Start: 2024-03-11 | End: 2024-03-12 | Stop reason: HOSPADM

## 2024-03-11 RX ADMIN — Medication 10 ML: at 21:02

## 2024-03-11 RX ADMIN — MECLIZINE HYDROCHLORIDE 25 MG: 25 TABLET ORAL at 12:36

## 2024-03-11 RX ADMIN — ONDANSETRON 4 MG: 2 INJECTION INTRAMUSCULAR; INTRAVENOUS at 15:21

## 2024-03-11 RX ADMIN — ATORVASTATIN CALCIUM 80 MG: 40 TABLET, FILM COATED ORAL at 21:01

## 2024-03-11 RX ADMIN — IOPAMIDOL 100 ML: 755 INJECTION, SOLUTION INTRAVENOUS at 13:21

## 2024-03-11 NOTE — ED PROVIDER NOTES
Subjective   History of Present Illness  Chief complaint: dizziness      Context: Patient is a 68-year-old female who comes in with complaints of dizziness.  States she turned to the left when she became dizzy and fell to her knees.  She notes some associated nausea.  She states the dizziness is worse with her eyes open and with changing positions.  Has never had vertigo.  She denies any associated chest pain shortness of breath or confusion.  Denies any numbness tingling weakness or headache.  No diplopia       Pcp: nava        Review of Systems   Constitutional:  Negative for fever.   Allergic/Immunologic: Environmental allergies: .[id number].[id number].       Past Medical History:   Diagnosis Date    Arthritis     Cancer     Sleep apnea        Allergies   Allergen Reactions    Codeine Nausea Only       Past Surgical History:   Procedure Laterality Date    BILATERAL BREAST REDUCTION Bilateral     CHEILECTOMY Left 2024    Procedure: HALLUX RIGIDUS CORRECTION LEFT;  Surgeon: Yann Troncoso DPM;  Location: [facility] MAIN OR;  Service: Podiatry;  Laterality: Left;    CHOLECYSTECTOMY      HYSTERECTOMY      SKIN CANCER EXCISION      on face    TONSILLECTOMY      TOTAL ABDOMINAL HYSTERECTOMY WITH SALPINGO OOPHORECTOMY Bilateral     TUBAL ABDOMINAL LIGATION         No family history on file.    Social History     Socioeconomic History    Marital status:    Tobacco Use    Smoking status: Former     Current packs/day: 0.00     Average packs/day: 1 pack/day for 12.0 years (12.0 ttl pk-yrs)     Types: Cigarettes     Start date:      Quit date:      Years since quittin.2   Substance and Sexual Activity    Alcohol use: Yes     Comment: socially           Objective   Physical Exam    Due to significant overcrowding in the emergency department patient was evaluated by myself in a hallway bed.  Explained to the patient our limitations due to overcrowding and they were in agreement to continue exam and  treatment at this time.       Vital signs in triage nurse note reviewed.  Constitutional: Awake, alert, well developed and well nourished.  Nontoxic in appearance  HEENT: Normocephalic, atraumatic; pupils are PERRL with intact EOM  ; oropharynx is pink and moist without exudate or erythema.  Neck: Supple, full range of motion without pain;    Cardiovascular: Regular rate and rhythm, normal S1-S2.    Pulmonary: Respiratory effort regular, nonlabored; breath sounds clear to auscultation all fields.  Abdomen: Soft, nontender, nondistended with normoactive bowel sounds; no rebound or guarding.  Musculoskeletal: Independent range of motion of all extremities, no palpable tenderness or edema. Spine is midline without obvious curvature scoliosis. No bony tenderness, soft tissue swelling, deformity is noted. Paraspinal musculature is soft, nontender.  Neuro: Alert oriented x3, speech is clear and appropriate.  Normal shoulder shrug, equal palate rise,   no facial asymmetry,no pronator drift, normal coordination.      Procedures           ED Course  ED Course as of 03/11/24 1547   Mon Mar 11, 2024   1251 Waiting for patient to go to CT [JW]   1332 Nurse reports her symptoms are starting to improve. [JW]   1338 Waiting for CTA [JW]   1400 Waiting for PT eval. [JW]      ED Course User Index  [JW] Rosy Ahumada, FRANNY                          Total (NIH Stroke Scale): 0             Labs Reviewed   COMPREHENSIVE METABOLIC PANEL - Abnormal; Notable for the following components:       Result Value    Glucose 112 (*)     All other components within normal limits    Narrative:     GFR Normal >60  Chronic Kidney Disease <60  Kidney Failure <15     PROTIME-INR - Abnormal; Notable for the following components:    Protime 11.8 (*)     All other components within normal limits   APTT - Abnormal; Notable for the following components:    PTT <20.0 (*)     All other components within normal limits   POCT GLUCOSE FINGERSTICK - Abnormal;  Notable for the following components:    Glucose 118 (*)     All other components within normal limits   SINGLE HSTROPONIN T - Normal    Narrative:     High Sensitive Troponin T Reference Range:  <14.0 ng/L- Negative Female for AMI  <22.0 ng/L- Negative Male for AMI  >=14 - Abnormal Female indicating possible myocardial injury.  >=22 - Abnormal Male indicating possible myocardial injury.   Clinicians would have to utilize clinical acumen, EKG, Troponin, and serial changes to determine if it is an Acute Myocardial Infarction or myocardial injury due to an underlying chronic condition.        CBC WITH AUTO DIFFERENTIAL - Normal   RAINBOW DRAW    Narrative:     The following orders were created for panel order [place] Draw.  Procedure                               Abnormality         Status                     ---------                               -----------         ------                     Green Top (Gel)[[id number]]                                  Final result               Lavender Top[[id number]]                                     Final result               Gold Top - SST[[id number]]                                   Final result               Light Blue Top[[id number]]                                   Final result                 Please view results for these tests on the individual orders.   TYPE AND SCREEN   CBC AND DIFFERENTIAL    Narrative:     The following orders were created for panel order CBC & Differential.  Procedure                               Abnormality         Status                     ---------                               -----------         ------                     CBC Auto Differential[[id number]]        Normal              Final result                 Please view results for these tests on the individual orders.   GREEN TOP   LAVENDER TOP   GOLD TOP - SST   LIGHT BLUE TOP     Medications   sodium chloride 0.9 % flush 10 mL (has no administration in time range)   meclizine (ANTIVERT)  tablet 25 mg (has no administration in time range)   ondansetron (ZOFRAN) injection 4 mg (has no administration in time range)   meclizine (ANTIVERT) tablet 25 mg (25 mg Oral Given 3/11/24 1236)   iopamidol (ISOVUE-370) 76 % injection 100 mL (100 mL Intravenous Given 3/11/24 1321)   ondansetron (ZOFRAN) injection 4 mg (4 mg Intravenous Given 3/11/24 1521)     XR Chest 1 View    Result Date: 3/11/2024  Impression: No acute chest findings. Generalized ectasia of the thoracic aorta. Electronically Signed: Livier Cameron MD  3/11/2024 1:41 PM EDT  Workstation ID: [id number]    CT Angiogram Head w AI Analysis of LVO    Result Date: 3/11/2024  Impression: CT angiogram of the head and neck demonstrates no evidence of flow-limiting stenosis, large vessel occlusion or aneurysm. Incidentally noted and partially imaged borderline aneurysmal ectasia of the ascending thoracic aorta, measuring up to 4.0 cm in greatest visualized transverse dimension. Consider nonemergent CT angiogram of the chest if not previously performed. Electronically Signed: Miguelito Chang MD  3/11/2024 1:37 PM EDT  Workstation ID: [id number]    CT Angiogram Neck    Result Date: 3/11/2024  Impression: CT angiogram of the head and neck demonstrates no evidence of flow-limiting stenosis, large vessel occlusion or aneurysm. Incidentally noted and partially imaged borderline aneurysmal ectasia of the ascending thoracic aorta, measuring up to 4.0 cm in greatest visualized transverse dimension. Consider nonemergent CT angiogram of the chest if not previously performed. Electronically Signed: Miguelito Chang MD  3/11/2024 1:37 PM EDT  Workstation ID: [id number]    CT Head Without Contrast Stroke Protocol    Result Date: 3/11/2024  Impression: No acute intracranial finding. Questionable left carotid siphon aneurysm versus other extra-axial lesion such as a meningioma. Correlate with CT findings. Electronically Signed: Livier Cameron MD  3/11/2024 1:11 PM EDT  Workstation  "ID: [id number]   Prior to Admission medications    Medication Sig Start Date End Date Taking? Authorizing Provider   docusate sodium (COLACE) 50 MG capsule COLACE CAPS 12/20/12   Keiry Foley MD   escitalopram (LEXAPRO) 20 MG tablet Take 1 tablet by mouth Daily.    Keiry Foley MD   magnesium 30 MG tablet Take 1 tablet by mouth.    Keiry Foley MD   spironolactone (ALDACTONE) 50 MG tablet TAKE 1 TABLET DAILY, IF WELL TOLERATED AFTER 2 WEEKS INCREASE TO 2 TABLETS DAILY 1/7/24   Keiry Foley MD   sulfacetamide (Bleph-10) 10 % ophthalmic solution BLEPH-10 10 % SOLN 3/2/16   Keiry Foley MD       Medical Decision Making      /74 (BP Location: Right arm, Patient Position: Sitting)   Pulse 89   Temp 98.3 °F (36.8 °C) (Oral)   Resp 18   Ht 165.1 cm (65\")   Wt 74.4 kg (164 lb 0.4 oz)   SpO2 94%   BMI 27.29 kg/m²         Radiology interpretation:  X-rays reviewed independently and interpreted by me: No cardiomegaly.  CT of the head negative for ICH, CTA head and neck no LVO, stable aneurysm  Further interpretation by radiologist as above  Lab interpretation:  Labs all viewed by me and significant for, white count 6.4, troponin 9, glucose 112    EKG viewed and interpreted by me and corroborated by Dr. Shah, sinus rhythm rate of 78  comparison: No prior for comparison            Appropriate PPE worn during exam.  Patient was seen in [place] due to overcrowding and census.  Her symptoms seem largely consistent with BPPV, CTs and labs were also obtained to evaluate for LVO ICH electrolyte abnormalities dehydration.  She was given IV fluids Zofran per EMS and meclizine.  During her physical therapy evaluation was notably mildly positive on the left and became nauseated was given additional Zofran.  I discussed with [facility] nurse practitioner who accepted patient for care.  Based on the clinical findings at this time I anticipate the patient will require a 2 midnight stay      i " discussed findings with patient who voices understanding of admission.  Discussed with Dr. Shah    Problems Addressed:  Dizziness: complicated acute illness or injury    Amount and/or Complexity of Data Reviewed  Labs: ordered.  Radiology: ordered.  ECG/medicine tests: ordered.    Risk  Prescription drug management.  Decision regarding hospitalization.        Final diagnoses:   Dizziness       ED Disposition  ED Disposition       ED Disposition   Decision to Admit    Condition   --    Comment   Level of Care: Med/Surg [1]   Diagnosis: Dizziness [[id number]]   Admitting Physician: JOSH CISNEROS [[id number]]   Attending Physician: JOSH CISNEROS [[id number]]                 No follow-up provider specified.       Medication List      No changes were made to your prescriptions during this visit.            Rosy Ahumada, APRN  03/11/24 1546

## 2024-03-11 NOTE — PLAN OF CARE
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 She reports that she get very dizzy, causing her to fall and hit her head. CT Head (-), CTA Head/Neck (-), and XR Chest (-). Pt is currently being followed for dizziness and PT consulted for evaluation and treatment of dizziness symptoms. She is currently living with spouse and reports being IND with all mobility with no AD. This date, she is AAOx4 and lying supine complaining of mild dizziness at rest. During vestibular evaluation, she was (-) with all oculomotor testing, no VOR abnormalities present, moderately hypotensive in standing dropping from 127>103 SBP. She also exhibited a very mild R-torsional nystagmus when performing the Logan-Hallpike to the L. She was treated with with Epley maneuver this date, but treatment seemed to exacerbate her symptoms. It is worth noting that symptoms seem to be exacerbated by positional changes rather than head turns. With complaints of lightheadedness with potential syncopal episode this morning, combined with symptom exacerbation with positional changes, PT does not seem to believe there is vestibular involvement with her current condition. However, she is very unsteady on her feet and not able to ambulate safely at this time. If admitted to [facility], PT will continue to follow to determine if canalith repositioning is necessary or if her condition is medically related. Planning OPPT, but recommendation could change pending clinical course. PT will follow.    Anticipated Discharge Disposition (PT): home with outpatient therapy services

## 2024-03-11 NOTE — THERAPY EVALUATION
Patient Name: Sue Chen  : 1955    MRN: [id number]                              Today's Date: 3/11/2024       Admit Date: 3/11/2024    Visit Dx:     ICD-10-CM ICD-9-CM   1. Dizziness  R42 780.4     Patient Active Problem List   Diagnosis    Aneurysm of ascending aorta without rupture    Elevated blood pressure reading    LILI on CPAP    Family history of thoracic aortic aneurysm    Dizziness     Past Medical History:   Diagnosis Date    Arthritis     Cancer     Sleep apnea      Past Surgical History:   Procedure Laterality Date    BILATERAL BREAST REDUCTION Bilateral     CHEILECTOMY Left 2024    Procedure: HALLUX RIGIDUS CORRECTION LEFT;  Surgeon: Yann Troncoso DPM;  Location: [facility] MAIN OR;  Service: Podiatry;  Laterality: Left;    CHOLECYSTECTOMY      HYSTERECTOMY      SKIN CANCER EXCISION      on face    TONSILLECTOMY      TOTAL ABDOMINAL HYSTERECTOMY WITH SALPINGO OOPHORECTOMY Bilateral 2004    TUBAL ABDOMINAL LIGATION        General Information       Row Name 24 1648          Physical Therapy Time and Intention    Document Type evaluation  -MB     Mode of Treatment physical therapy  -MB       Row Name 24 1648          General Information    Patient Profile Reviewed yes  -MB     Prior Level of Function independent:;all household mobility;community mobility;gait;transfer;ADL's;home management  -MB     Existing Precautions/Restrictions no known precautions/restrictions  -MB     Barriers to Rehab none identified  -MB       Row Name 24 1648          Living Environment    People in Home spouse  -MB       Row Name 24 1648          Cognition    Orientation Status (Cognition) oriented x 4  -MB       Row Name 24 1648          Safety Issues, Functional Mobility    Impairments Affecting Function (Mobility) balance;endurance/activity tolerance;visual/perceptual  -MB               User Key  (r) = Recorded By, (t) = Taken By, (c) = Cosigned By      Initials Name Provider Type     Long Barger, PT Physical Therapist                   Mobility       Row Name 03/11/24 1649          Bed Mobility    Bed Mobility bed mobility (all) activities  -MB     All Activities, [place] (Bed Mobility) independent  -MB     Assistive Device (Bed Mobility) bed rails;head of bed elevated  -MB       Row Name 03/11/24 1649          Sit-Stand Transfer    Sit-Stand [place] (Transfers) standby assist  -MB     Comment, (Sit-Stand Transfer) no AD  -MB               User Key  (r) = Recorded By, (t) = Taken By, (c) = Cosigned By      Initials Name Provider Type    Long Barger, PT Physical Therapist                   Obj/Interventions       Row Name 03/11/24 1650          Range of Motion Comprehensive    General Range of Motion no range of motion deficits identified  -MB       Row Name 03/11/24 1650          Strength Comprehensive (MMT)    General Manual Muscle Testing (MMT) Assessment no strength deficits identified  -MB       Row Name 03/11/24 1650          Balance    Balance Assessment sitting static balance;sitting dynamic balance;sit to stand dynamic balance;standing static balance;standing dynamic balance  -MB     Static Sitting Balance independent  -MB     Dynamic Sitting Balance standby assist  -MB     Position, Sitting Balance unsupported;sitting edge of bed  -MB     Sit to Stand Dynamic Balance contact guard  -MB     Static Standing Balance contact guard  -MB     Dynamic Standing Balance minimal assist  -MB       Row Name 03/11/24 1650          Sensory Assessment (Somatosensory)    Sensory Assessment (Somatosensory) sensation intact  -MB               User Key  (r) = Recorded By, (t) = Taken By, (c) = Cosigned By      Initials Name Provider Type    Long Barger, PT Physical Therapist                   Goals/Plan       Row Name 03/11/24 1651          Bed Mobility Goal 1 (PT)    Activity/Assistive Device (Bed Mobility Goal 1, PT) bed mobility activities, all  -MB     [place] Level/Cues  Needed (Bed Mobility Goal 1, PT) independent  -MB     Time Frame (Bed Mobility Goal 1, PT) long term goal (LTG);2 weeks  -MB       Row Name 03/11/24 1651          Transfer Goal 1 (PT)    Activity/Assistive Device (Transfer Goal 1, PT) transfers, all  -MB     [place] Level/Cues Needed (Transfer Goal 1, PT) independent  -MB     Time Frame (Transfer Goal 1, PT) long term goal (LTG);2 weeks  -MB       Row Name 03/11/24 1651          Gait Training Goal 1 (PT)    Activity/Assistive Device (Gait Training Goal 1, PT) gait (walking locomotion)  -MB     [place] Level (Gait Training Goal 1, PT) standby assist  -MB     Distance (Gait Training Goal 1, PT) 100  -MB     Time Frame (Gait Training Goal 1, PT) long term goal (LTG);2 weeks  -MB       Row Name 03/11/24 1651          Therapy Assessment/Plan (PT)    Planned Therapy Interventions (PT) balance training;bed mobility training;neuromuscular re-education;home exercise program;gait training;patient/family education;strengthening;transfer training;vestibular therapy  -MB               User Key  (r) = Recorded By, (t) = Taken By, (c) = Cosigned By      Initials Name Provider Type    Long Barger, PT Physical Therapist                   Clinical Impression       Row Name 03/11/24 1650          Pain    Pretreatment Pain Rating 0/10 - no pain  -MB     Posttreatment Pain Rating 0/10 - no pain  -MB       Row Name 03/11/24 1704 03/11/24 1650       Plan of Care Review    Plan of Care Reviewed With -- patient;spouse;daughter  -MB    Progress -- no change  -MB    Outcome Evaluation Pt is a 67 y/o F admitted to [facility] ED on 3/11/24 with complaints of syncopal episode earlier this AM when performing laundry. She reports that she get very dizzy, causing her to fall and hit her head. CT Head (-), CTA Head/Neck (-), and XR Chest (-). Pt is currently being followed for dizziness and PT consulted for evaluation and treatment of dizziness symptoms. She is currently living with spouse  and reports being IND with all mobility with no AD. This date, she is AAOx4 and lying supine complaining of mild dizziness at rest. During vestibular evaluation, she was (-) with all oculomotor testing, no VOR abnormalities present, moderately hypotensive in standing dropping from 127>103 SBP. She also exhibited a very mild R-torsional nystagmus when performing the Logan-Hallpike to the L. She was treated with with Epley maneuver this date, but treatment seemed to exacerbate her symptoms. It is worth noting that symptoms seem to be exacerbated by positional changes rather than head turns. With complaints of lightheadedness with potential syncopal episode this morning, combined with symptom exacerbation with positional changes, PT does not seem to believe there is vestibular involvement with her current condition. However, she is very unsteady on her feet and not able to ambulate safely at this time. If admitted to [facility], PT will continue to follow to determine if canalith repositioning is necessary or if her condition is medically related. Planning OPPT, but recommendation could change pending clinical course. PT will follow.  -MB --      Row Name 03/11/24 1650          Therapy Assessment/Plan (PT)    Rehab Potential (PT) good, to achieve stated therapy goals  -MB     Criteria for Skilled Interventions Met (PT) yes;meets criteria  -MB     Therapy Frequency (PT) 5 times/wk  -MB     Predicted Duration of Therapy Intervention (PT) until d/c  -MB       Row Name 03/11/24 1650          Vital Signs    Pre Systolic BP Rehab 117  -MB     Pre Treatment Diastolic BP 82  -MB     Intra Systolic BP Rehab 127  -MB     Intra Treatment Diastolic BP 76  -MB     Post Systolic BP Rehab 103  -MB     Post Treatment Diastolic BP 90  -MB     O2 Delivery Pre Treatment room air  -MB     O2 Delivery Intra Treatment room air  -MB     O2 Delivery Post Treatment room air  -MB     Pre Patient Position Supine  -MB     Intra Patient Position Standing   -MB     Post Patient Position Supine  -MB       Row Name 03/11/24 1650          Positioning and Restraints    Pre-Treatment Position in bed  -MB     Post Treatment Position bed  -MB     In Bed notified nsg;supine;encouraged to call for assist;patient within staff view;with family/caregiver  -MB               User Key  (r) = Recorded By, (t) = Taken By, (c) = Cosigned By      Initials Name Provider Type    Long Barger, LEEROY Physical Therapist                   Outcome Measures       Row Name 03/11/24 1651          How much help from another person do you currently need...    Turning from your back to your side while in flat bed without using bedrails? 4  -MB     Moving from lying on back to sitting on the side of a flat bed without bedrails? 4  -MB     Moving to and from a bed to a chair (including a wheelchair)? 3  -MB     Standing up from a chair using your arms (e.g., wheelchair, bedside chair)? 3  -MB     Climbing 3-5 steps with a railing? 2  -MB     To walk in hospital room? 2  -MB     AM-PAC 6 Clicks Score (PT) 18  -MB     Highest Level of Mobility Goal 6 --> Walk 10 steps or more  -MB       Row Name 03/11/24 1651          Functional Assessment    Outcome Measure Options AM-PAC 6 Clicks Basic Mobility (PT)  -MB               User Key  (r) = Recorded By, (t) = Taken By, (c) = Cosigned By      Initials Name Provider Type    Long Barger, LEEROY Physical Therapist                                 Physical Therapy Education       Title: PT OT SLP Therapies (Done)       Topic: Physical Therapy (Done)       Point: Mobility training (Done)       Learning Progress Summary             Patient Acceptance, E,TB, VU by MB at 3/11/2024 1651                         Point: Body mechanics (Done)       Learning Progress Summary             Patient Acceptance, E,TB, VU by MB at 3/11/2024 1651                         Point: Precautions (Done)       Learning Progress Summary             Patient Acceptance, E,TB, VU by MB at  3/11/2024 1651                                         User Key       Initials Effective Dates Name Provider Type Discipline    MB 06/06/23 -  Long Del Rosario, PT Physical Therapist PT                  PT Recommendation and Plan  Planned Therapy Interventions (PT): balance training, bed mobility training, neuromuscular re-education, home exercise program, gait training, patient/family education, strengthening, transfer training, vestibular therapy  Plan of Care Reviewed With: patient, spouse, daughter  Progress: no change  Outcome Evaluation: Pt is a 69 y/o F admitted to [facility] ED on 3/11/24 with complaints of syncopal episode earlier this AM when performing laundry. She reports that she get very dizzy, causing her to fall and hit her head. CT Head (-), CTA Head/Neck (-), and XR Chest (-). Pt is currently being followed for dizziness and PT consulted for evaluation and treatment of dizziness symptoms. She is currently living with spouse and reports being IND with all mobility with no AD. This date, she is AAOx4 and lying supine complaining of mild dizziness at rest. During vestibular evaluation, she was (-) with all oculomotor testing, no VOR abnormalities present, moderately hypotensive in standing dropping from 127>103 SBP. She also exhibited a very mild R-torsional nystagmus when performing the Logan-Hallpike to the L. She was treated with with Epley maneuver this date, but treatment seemed to exacerbate her symptoms. It is worth noting that symptoms seem to be exacerbated by positional changes rather than head turns. With complaints of lightheadedness with potential syncopal episode this morning, combined with symptom exacerbation with positional changes, PT does not seem to believe there is vestibular involvement with her current condition. However, she is very unsteady on her feet and not able to ambulate safely at this time. If admitted to [facility], PT will continue to follow to determine if canalith repositioning is  necessary or if her condition is medically related. Planning OPPT, but recommendation could change pending clinical course. PT will follow.     Time Calculation:   PT Evaluation Complexity  History, PT Evaluation Complexity: 1-2 personal factors and/or comorbidities  Examination of Body Systems (PT Eval Complexity): total of 3 or more elements  Clinical Presentation (PT Evaluation Complexity): evolving  Clinical Decision Making (PT Evaluation Complexity): moderate complexity  Overall Complexity (PT Evaluation Complexity): moderate complexity     PT Charges       Row Name 03/11/24 1652             Time Calculation    Start Time 1430  -MB      Stop Time 1520  -MB      Time Calculation (min) 50 min  -MB      PT Received On 03/11/24  -MB      PT - Next Appointment 03/12/24  -MB      PT Goal Re-Cert Due Date 03/25/24  -MB         Time Calculation- PT    Total Timed Code Minutes- PT 0 minute(s)  -MB                User Key  (r) = Recorded By, (t) = Taken By, (c) = Cosigned By      Initials Name Provider Type    Long Barger, PT Physical Therapist                  Therapy Charges for Today       Code Description Service Date Service Provider Modifiers Qty    [id number] HC-PT EVAL MOD COMPLEXITY 5 3/11/2024 Long Del Rosario, PT  1            PT G-Codes  Outcome Measure Options: AM-PAC 6 Clicks Basic Mobility (PT)  AM-PAC 6 Clicks Score (PT): 18  PT Discharge Summary  Anticipated Discharge Disposition (PT): home with outpatient therapy services    Long Del Rosario PT  3/11/2024

## 2024-03-11 NOTE — H&P
Patient Care Team:  Ginny Parker MD as PCP - General (Family Medicine)  Emi Birmingham MD as Consulting Physician (Obstetrics and Gynecology)    Chief complaint dizziness    Subjective     Patient is a 68 y.o. female who presents with sudden onset of dizziness, described as room spinning and being off balance this morning.  There was no headache.  There is been associated nausea but no vomiting.  She felt well when she went to bed last night and has had no recent illnesses.  She has been eating and drinking normally.  She has no prior history of vertigo.  In the ER she was noted to have nystagmus.  Since arrival in the ER she feels better and no longer has dizziness at rest.  She continues to be off balance if she turns her head or attempts to sit up.  Initial workup, including labs, EKG, head CT and CTA has been unremarkable except for incidental finding of ectasia of the thoracic aorta.    Review of Systems   Constitutional:  Positive for activity change, appetite change and diaphoresis. Negative for chills, fatigue and fever.   HENT:  Negative for ear pain, facial swelling, postnasal drip, rhinorrhea and sinus pain.    Eyes:  Negative for photophobia and visual disturbance.   Respiratory:  Negative for cough, shortness of breath and stridor.    Cardiovascular:  Negative for chest pain, palpitations and leg swelling.   Gastrointestinal:  Negative for abdominal pain, constipation, diarrhea, nausea and vomiting.   Endocrine: Negative for polyuria.   Genitourinary:  Negative for dysuria.   Musculoskeletal:  Positive for gait problem. Negative for arthralgias and myalgias.   Skin:  Negative for rash and wound.   Neurological:  Positive for dizziness. Negative for tremors, syncope, speech difficulty, weakness and light-headedness.   Psychiatric/Behavioral:  Negative for confusion.           History  Past Medical History:   Diagnosis Date    Arthritis     Cancer     Sleep apnea      Past Surgical History:    Procedure Laterality Date    BILATERAL BREAST REDUCTION Bilateral     CHEILECTOMY Left 2024    Procedure: HALLUX RIGIDUS CORRECTION LEFT;  Surgeon: Yann Troncoso DPM;  [place] [facility] [place];  Service: Podiatry;  Laterality: Left;    CHOLECYSTECTOMY      HYSTERECTOMY      SKIN CANCER EXCISION      on face    TONSILLECTOMY      TOTAL ABDOMINAL HYSTERECTOMY WITH SALPINGO OOPHORECTOMY Bilateral     TUBAL ABDOMINAL LIGATION       No family history on file.  Social History     Tobacco Use    Smoking status: Former     Current packs/day: 0.00     Average packs/day: 1 pack/day for 12.0 years (12.0 ttl pk-yrs)     Types: Cigarettes     Start date:      Quit date:      Years since quittin.2   Substance Use Topics    Alcohol use: Yes     Comment: socially     (Not in a hospital admission)    Allergies:  Codeine    Objective     Vital Signs  Temp:  [98.3 °F (36.8 °C)] 98.3 °F (36.8 °C)  Heart Rate:  [74-89] 80  Resp:  [18] 18  BP: (106-143)/(71-91) 106/71     Physical Exam:      General Appearance:    Alert, cooperative, in no acute distress   Head:    Normocephalic, without obvious abnormality, atraumatic   Eyes:            Lids and lashes normal, conjunctivae and sclerae normal, no   icterus, no pallor, corneas clear, PERRLA   Ears:    Ears appear intact with no abnormalities noted   Throat:   No oral lesions, no thrush, oral mucosa moist   Neck:   No adenopathy, supple, trachea midline, no thyromegaly, no   carotid bruit, no JVD   Lungs:     Clear to auscultation,respirations regular, even and                  unlabored    Heart:    Regular rhythm and normal rate, normal S1 and S2, no            murmur, no gallop, no rub, no click   Chest Wall:    No abnormalities observed   Abdomen:     Normal bowel sounds, no masses, no organomegaly, soft        non-tender, non-distended, no guarding, no rebound                tenderness   Extremities:   Moves all extremities well, no edema, no cyanosis, no              redness   Pulses:   Pulses palpable and equal bilaterally   Skin:   No bleeding, bruising or rash   Lymph nodes:   No palpable adenopathy   Neurologic: Nonfocal exam; no nystagmus noted at the time of my exam.       Results Review:     Imaging Results (Last 24 Hours)       Procedure Component Value Units Date/Time    XR Chest 1 View [[id number]] Collected: 03/11/24 1341     Updated: 03/11/24 1344    Narrative:      XR CHEST 1 VW    Date of Exam: 3/11/2024 1:15 PM EDT    Indication: Acute Stroke Protocol (onset < 12 hrs)    Comparison: CT cardiac calcium score study 1/29/2024.    Findings:  No acute airspace disease. Thoracic aorta appears generally ectatic. Benign calcified granulomatous changes suggested within the right hemithorax. No pleural effusion or pneumothorax or acute osseous abnormality. Cholecystectomy.      Impression:      Impression:  No acute chest findings.  Generalized ectasia of the thoracic aorta.      Electronically Signed: Livier Cameron MD    3/11/2024 1:41 PM EDT    Workstation ID: [id number]    CT Angiogram Head w AI Analysis of LVO [[id number]] Collected: 03/11/24 1324     Updated: 03/11/24 1339    Narrative:      CT ANGIOGRAM HEAD W AI ANALYSIS OF LVO, CT ANGIOGRAM NECK    Date of Exam: 3/11/2024 1:10 PM EDT    Indication: Stroke, follow up  Neuro deficit, acute stroke suspected  Acute Stroke.    Comparison: None available.    Technique: CTA of the head and neck was performed after the uneventful intravenous administration of iodinated contrast. Reconstructed coronal and sagittal images were also obtained. In addition, a 3-D volume rendered image was created for   interpretation. Automated exposure control and iterative reconstruction methods were used.      Findings:  The lung apices are clear. Evaluation of the neck soft tissues demonstrates no pathologic cervical adenopathy or aerodigestive tract mass. The osseous structures demonstrate no evidence of acute fracture or aggressive  osseous lesion, with multilevel   spondylosis change present. The visualized ascending thoracic aorta demonstrates questionable aneurysmal ectasia measuring up to 4.0 cm transverse. There is 4 vessel branching with the left vertebral artery arising directly. The visualized subclavian   arteries are patent bilaterally. On the right, there is no significant atherosclerotic narrowing of the ICA origin, 0% stenosis by NASCET criteria. Similar 0% narrowing is present on the left. The vertebral arteries are normal in course and caliber   bilaterally. Intracranially, the carotid siphons demonstrate no significant atherosclerotic narrowing. The anterior cerebral arteries are normal in course and caliber. The right middle cerebral artery demonstrates no evidence of flow-limiting stenosis,   large vessel occlusion or aneurysm. The left middle cerebral artery is similarly normal in course and caliber. The vertebrobasilar system is patent. The posterior cerebral arteries are normal.      Impression:      Impression:  CT angiogram of the head and neck demonstrates no evidence of flow-limiting stenosis, large vessel occlusion or aneurysm.    Incidentally noted and partially imaged borderline aneurysmal ectasia of the ascending thoracic aorta, measuring up to 4.0 cm in greatest visualized transverse dimension. Consider nonemergent CT angiogram of the chest if not previously performed.        Electronically Signed: Miguelito Chang MD    3/11/2024 1:37 PM EDT    Workstation ID: [id number]    CT Angiogram Neck [[id number]] Collected: 03/11/24 1324     Updated: 03/11/24 1339    Narrative:      CT ANGIOGRAM HEAD W AI ANALYSIS OF LVO, CT ANGIOGRAM NECK    Date of Exam: 3/11/2024 1:10 PM EDT    Indication: Stroke, follow up  Neuro deficit, acute stroke suspected  Acute Stroke.    Comparison: None available.    Technique: CTA of the head and neck was performed after the uneventful intravenous administration of iodinated contrast.  Reconstructed coronal and sagittal images were also obtained. In addition, a 3-D volume rendered image was created for   interpretation. Automated exposure control and iterative reconstruction methods were used.      Findings:  The lung apices are clear. Evaluation of the neck soft tissues demonstrates no pathologic cervical adenopathy or aerodigestive tract mass. The osseous structures demonstrate no evidence of acute fracture or aggressive osseous lesion, with multilevel   spondylosis change present. The visualized ascending thoracic aorta demonstrates questionable aneurysmal ectasia measuring up to 4.0 cm transverse. There is 4 vessel branching with the left vertebral artery arising directly. The visualized subclavian   arteries are patent bilaterally. On the right, there is no significant atherosclerotic narrowing of the ICA origin, 0% stenosis by NASCET criteria. Similar 0% narrowing is present on the left. The vertebral arteries are normal in course and caliber   bilaterally. Intracranially, the carotid siphons demonstrate no significant atherosclerotic narrowing. The anterior cerebral arteries are normal in course and caliber. The right middle cerebral artery demonstrates no evidence of flow-limiting stenosis,   large vessel occlusion or aneurysm. The left middle cerebral artery is similarly normal in course and caliber. The vertebrobasilar system is patent. The posterior cerebral arteries are normal.      Impression:      Impression:  CT angiogram of the head and neck demonstrates no evidence of flow-limiting stenosis, large vessel occlusion or aneurysm.    Incidentally noted and partially imaged borderline aneurysmal ectasia of the ascending thoracic aorta, measuring up to 4.0 cm in greatest visualized transverse dimension. Consider nonemergent CT angiogram of the chest if not previously performed.        Electronically Signed: Miguelito Chang MD    3/11/2024 1:37 PM EDT    Workstation ID: [id number]    CT  Head Without Contrast Stroke Protocol [[id number]] Collected: 03/11/24 1306     Updated: 03/11/24 1313    Narrative:      CT HEAD WO CONTRAST STROKE PROTOCOL    Date of Exam: 3/11/2024 1:02 PM EDT    Indication: Neuro deficit, acute stroke suspected  Neuro deficit, acute, stroke suspected.    Comparison: None available.    Technique: Axial CT images were obtained of the head without contrast administration.  Reconstructed coronal and sagittal images were also obtained. Automated exposure control and iterative construction methods were used.    Scan Time: 3/11/2024 at 1302  Results discussed with the referring emergency room clinician at 3/11/2024 at 1:07 p.m.      Findings:  No acute intracranial hemorrhage, mass lesion, mass effect, midline shift, or evidence of acute evolving infarct. Normal ventricular configuration. Paranasal sinuses are clear. Mastoid air cells are clear. Orbital structures are within normal limits.   There is asymmetric prominence of the left carotid siphon versus soft tissue density such as meningioma, measuring 11 x 11 mm (series 2 image 22).      Impression:      Impression:  No acute intracranial finding.  Questionable left carotid siphon aneurysm versus other extra-axial lesion such as a meningioma. Correlate with CT findings.      Electronically Signed: Livier Cameron MD    3/11/2024 1:11 PM EDT    Workstation ID: [id number]             Lab Results (last 24 hours)       Procedure Component Value Units Date/Time    [facility] Draw [[id number]] Collected: 03/11/24 1207    Specimen: Blood Updated: 03/11/24 1315    Narrative:      The following orders were created for panel order [facility] Draw.  Procedure                               Abnormality         Status                     ---------                               -----------         ------                     Green Top (Gel)[[id number]]                                  Final result               Lavender Top[[id number]]                                      Final result               Gold Top - SST[[id number]]                                   Final result               Light Blue Top[[id number]]                                   Final result                 Please view results for these tests on the individual orders.    Green Top (Gel) [[id number]] Collected: 03/11/24 1207    Specimen: Blood Updated: 03/11/24 1315     Extra Tube Hold for add-ons.     Comment: Auto resulted.       Lavender Top [[id number]] Collected: 03/11/24 1207    Specimen: Blood Updated: 03/11/24 1315     Extra Tube hold for add-on     Comment: Auto resulted       Gold Top - SST [[id number]] Collected: 03/11/24 1207    Specimen: Blood Updated: 03/11/24 1315     Extra Tube Hold for add-ons.     Comment: Auto resulted.       Light Blue Top [[id number]] Collected: 03/11/24 1207    Specimen: Blood Updated: 03/11/24 1315     Extra Tube Hold for add-ons.     Comment: Auto resulted       POC Glucose Once [[id number]]  (Abnormal) Collected: 03/11/24 1253    Specimen: Blood Updated: 03/11/24 1255     Glucose 118 mg/dL      Comment: Serial Number: [id number]:  [id number]       Protime-INR [[id number]]  (Abnormal) Collected: 03/11/24 1207    Specimen: Blood Updated: 03/11/24 1255     Protime 11.8 Seconds      INR 1.09    aPTT [[id number]]  (Abnormal) Collected: 03/11/24 1207    Specimen: Blood Updated: 03/11/24 1255     PTT <20.0 seconds     Comprehensive Metabolic Panel [[id number]]  (Abnormal) Collected: 03/11/24 1207    Specimen: Blood Updated: 03/11/24 1245     Glucose 112 mg/dL      BUN 17 mg/dL      Creatinine 0.79 mg/dL      Sodium 140 mmol/L      Potassium 4.3 mmol/L      Comment: Slight hemolysis detected by analyzer. Result may be falsely elevated.        Chloride 103 mmol/L      CO2 25.0 mmol/L      Calcium 9.8 mg/dL      Total Protein 6.8 g/dL      Albumin 4.2 g/dL      ALT (SGPT) 17 U/L      AST (SGOT) 16 U/L      Alkaline Phosphatase 59 U/L      Total Bilirubin 0.2 mg/dL      Globulin  2.6 gm/dL      A/G Ratio 1.6 g/dL      BUN/Creatinine Ratio 21.5     Anion Gap 12.0 mmol/L      eGFR 82.1 mL/min/1.73     Narrative:      GFR Normal >60  Chronic Kidney Disease <60  Kidney Failure <15      Single High Sensitivity Troponin T [[id number]]  (Normal) Collected: 03/11/24 1207    Specimen: Blood Updated: 03/11/24 1245     HS Troponin T 9 ng/L     Narrative:      High Sensitive Troponin T Reference Range:  <14.0 ng/L- Negative Female for AMI  <22.0 ng/L- Negative Male for AMI  >=14 - Abnormal Female indicating possible myocardial injury.  >=22 - Abnormal Male indicating possible myocardial injury.   Clinicians would have to utilize clinical acumen, EKG, Troponin, and serial changes to determine if it is an Acute Myocardial Infarction or myocardial injury due to an underlying chronic condition.         CBC & Differential [[id number]]  (Normal) Collected: 03/11/24 1207    Specimen: Blood Updated: 03/11/24 1224    Narrative:      The following orders were created for panel order CBC & Differential.  Procedure                               Abnormality         Status                     ---------                               -----------         ------                     CBC Auto Differential[[id number]]        Normal              Final result                 Please view results for these tests on the individual orders.    CBC Auto Differential [[id number]]  (Normal) Collected: 03/11/24 1207    Specimen: Blood Updated: 03/11/24 1224     WBC 6.40 10*3/mm3      RBC 4.69 10*6/mm3      Hemoglobin 13.4 g/dL      Hematocrit 40.5 %      MCV 86.5 fL      MCH 28.5 pg      MCHC 33.0 g/dL      RDW 14.2 %      RDW-SD 45.5 fl      MPV 9.8 fL      Platelets 189 10*3/mm3      Neutrophil % 70.8 %      Lymphocyte % 20.1 %      Monocyte % 6.4 %      Eosinophil % 1.9 %      Basophil % 0.8 %      Neutrophils, Absolute 4.50 10*3/mm3      Lymphocytes, Absolute 1.30 10*3/mm3      Monocytes, Absolute 0.40 10*3/mm3      Eosinophils,  Absolute 0.10 10*3/mm3      Basophils, Absolute 0.00 10*3/mm3      nRBC 0.1 /100 WBC              I reviewed the patient's new clinical results.    Assessment & Plan       Dizziness  -Symptoms seem most compatible with benign positional vertigo.  Stroke workup has been unremarkable thus far.  Will order MRI without contrast to ensure no small cerebellar infarct not appreciated on CT.  Physical therapy has been consulted.  Will treat symptomatically with meclizine, and antiemetics.    Depression-resume home dose of Lexapro    Scd's for dvt prophy    Ectactic thoracic aorta - non emergent (outpatient) ct chest to evaluate     I discussed the patient's findings and my recommendations with patient.     Leatha Murcia MD  03/11/24  16:55 EDT

## 2024-03-11 NOTE — Clinical Note
Level of Care: Telemetry [5]   Admitting Physician: JOSH CISNEROS [[id number]]   Attending Physician: JOSH CISNEROS [[id number]]   Bed Request Comments: serena

## 2024-03-12 ENCOUNTER — APPOINTMENT (OUTPATIENT)
Dept: MRI IMAGING | Facility: HOSPITAL | Age: 69
End: 2024-03-12
Payer: MEDICARE

## 2024-03-12 VITALS
SYSTOLIC BLOOD PRESSURE: 114 MMHG | TEMPERATURE: 98.3 F | RESPIRATION RATE: 16 BRPM | OXYGEN SATURATION: 95 % | HEIGHT: 65 IN | DIASTOLIC BLOOD PRESSURE: 80 MMHG | WEIGHT: 176.37 LBS | BODY MASS INDEX: 29.38 KG/M2 | HEART RATE: 70 BPM

## 2024-03-12 LAB
QT INTERVAL: 372 MS
QTC INTERVAL: 423 MS
TSH SERPL DL<=0.05 MIU/L-ACNC: 1.6 UIU/ML (ref 0.27–4.2)
VIT B12 BLD-MCNC: 286 PG/ML (ref 211–946)

## 2024-03-12 PROCEDURE — 25010000002 ONDANSETRON PER 1 MG: Performed by: INTERNAL MEDICINE

## 2024-03-12 PROCEDURE — G0378 HOSPITAL OBSERVATION PER HR: HCPCS

## 2024-03-12 PROCEDURE — 97165 OT EVAL LOW COMPLEX 30 MIN: CPT

## 2024-03-12 PROCEDURE — 95992 CANALITH REPOSITIONING PROC: CPT

## 2024-03-12 PROCEDURE — 97530 THERAPEUTIC ACTIVITIES: CPT

## 2024-03-12 PROCEDURE — 97116 GAIT TRAINING THERAPY: CPT

## 2024-03-12 PROCEDURE — 96376 TX/PRO/DX INJ SAME DRUG ADON: CPT

## 2024-03-12 PROCEDURE — 70551 MRI BRAIN STEM W/O DYE: CPT

## 2024-03-12 PROCEDURE — 99214 OFFICE O/P EST MOD 30 MIN: CPT | Performed by: NURSE PRACTITIONER

## 2024-03-12 RX ORDER — BUTALBITAL, ACETAMINOPHEN AND CAFFEINE 50; 325; 40 MG/1; MG/1; MG/1
2 TABLET ORAL 2 TIMES DAILY PRN
Status: DISCONTINUED | OUTPATIENT
Start: 2024-03-12 | End: 2024-03-12 | Stop reason: HOSPADM

## 2024-03-12 RX ORDER — SPIRONOLACTONE 50 MG/1
50 TABLET, FILM COATED ORAL DAILY
Qty: 30 TABLET | Refills: 1 | Status: SHIPPED | OUTPATIENT
Start: 2024-03-12

## 2024-03-12 RX ORDER — MECLIZINE HYDROCHLORIDE 25 MG/1
25 TABLET ORAL 3 TIMES DAILY PRN
Qty: 30 TABLET | Refills: 0 | Status: SHIPPED | OUTPATIENT
Start: 2024-03-12

## 2024-03-12 RX ORDER — IBUPROFEN 400 MG/1
400 TABLET ORAL EVERY 6 HOURS PRN
Status: DISCONTINUED | OUTPATIENT
Start: 2024-03-12 | End: 2024-03-12 | Stop reason: HOSPADM

## 2024-03-12 RX ORDER — BACLOFEN 10 MG/1
10 TABLET ORAL 2 TIMES DAILY PRN
Status: DISCONTINUED | OUTPATIENT
Start: 2024-03-12 | End: 2024-03-12 | Stop reason: HOSPADM

## 2024-03-12 RX ORDER — IBUPROFEN 400 MG/1
400 TABLET ORAL EVERY 6 HOURS PRN
Status: DISCONTINUED | OUTPATIENT
Start: 2024-03-12 | End: 2024-03-12 | Stop reason: SDUPTHER

## 2024-03-12 RX ADMIN — Medication 10 ML: at 08:49

## 2024-03-12 RX ADMIN — MECLIZINE HYDROCHLORIDE 25 MG: 25 TABLET ORAL at 16:28

## 2024-03-12 RX ADMIN — MECLIZINE HYDROCHLORIDE 25 MG: 25 TABLET ORAL at 08:40

## 2024-03-12 RX ADMIN — ACETAMINOPHEN 650 MG: 325 TABLET, FILM COATED ORAL at 08:41

## 2024-03-12 RX ADMIN — BACLOFEN 10 MG: 10 TABLET ORAL at 15:13

## 2024-03-12 RX ADMIN — BUTALBITAL, ACETAMINOPHEN, AND CAFFEINE 2 TABLET: 50; 325; 40 TABLET ORAL at 15:13

## 2024-03-12 RX ADMIN — ONDANSETRON 4 MG: 2 INJECTION INTRAMUSCULAR; INTRAVENOUS at 15:12

## 2024-03-12 RX ADMIN — ESCITALOPRAM OXALATE 20 MG: 10 TABLET ORAL at 08:40

## 2024-03-12 NOTE — PLAN OF CARE
Problem: Adult Inpatient Plan of Care  Goal: Plan of Care Review  Outcome: Met  Goal: Patient-Specific Goal (Individualized)  Outcome: Met  Goal: Absence of Hospital-Acquired Illness or Injury  Outcome: Met  Intervention: Identify and Manage Fall Risk  Recent Flowsheet Documentation  Taken 3/12/2024 1400 by Chely Burton RN  Safety Promotion/Fall Prevention: safety round/check completed  Taken 3/12/2024 1215 by Chely Burton RN  Safety Promotion/Fall Prevention:   activity supervised   assistive device/personal items within reach   clutter free environment maintained   fall prevention program maintained   nonskid shoes/slippers when out of bed   room organization consistent   safety round/check completed  Taken 3/12/2024 1000 by Chely Burton RN  Safety Promotion/Fall Prevention:   activity supervised   assistive device/personal items within reach   clutter free environment maintained   fall prevention program maintained   nonskid shoes/slippers when out of bed   room organization consistent   safety round/check completed  Taken 3/12/2024 0815 by Chely Burton RN  Safety Promotion/Fall Prevention:   activity supervised   assistive device/personal items within reach   clutter free environment maintained   fall prevention program maintained   nonskid shoes/slippers when out of bed   room organization consistent   safety round/check completed  Intervention: Prevent Skin Injury  Recent Flowsheet Documentation  Taken 3/12/2024 1615 by Chely Burton RN  Body Position: position changed independently  Skin Protection:   adhesive use limited   tubing/devices free from skin contact  Taken 3/12/2024 1215 by Chely Burton RN  Body Position: position changed independently  Taken 3/12/2024 1000 by Chely Burton RN  Body Position: position changed independently  Taken 3/12/2024 0815 by Chely Burton RN  Body Position: position changed independently  Skin Protection:   adhesive use limited   tubing/devices  free from skin contact  Intervention: Prevent and Manage VTE (Venous Thromboembolism) Risk  Recent Flowsheet Documentation  Taken 3/12/2024 1615 by Chely Burton RN  Activity Management: up ad davin  VTE Prevention/Management:   bilateral   sequential compression devices off  Taken 3/12/2024 1215 by Chely Burton RN  Activity Management: up ad davin  VTE Prevention/Management:   bilateral   sequential compression devices off   patient refused intervention  Taken 3/12/2024 1000 by Chely Burton RN  Activity Management: up ad davin  Taken 3/12/2024 0815 by Chely Burton RN  Activity Management: up ad davin  VTE Prevention/Management:   bilateral   sequential compression devices on  Intervention: Prevent Infection  Recent Flowsheet Documentation  Taken 3/12/2024 1615 by Chely Burton RN  Infection Prevention:   environmental surveillance performed   equipment surfaces disinfected   hand hygiene promoted   personal protective equipment utilized   single patient room provided  Taken 3/12/2024 1215 by Chely Burton RN  Infection Prevention:   environmental surveillance performed   equipment surfaces disinfected   hand hygiene promoted   personal protective equipment utilized   single patient room provided  Taken 3/12/2024 1000 by Chely Burton RN  Infection Prevention:   environmental surveillance performed   equipment surfaces disinfected   hand hygiene promoted   personal protective equipment utilized   single patient room provided  Taken 3/12/2024 0815 by Chely Burton RN  Infection Prevention:   environmental surveillance performed   equipment surfaces disinfected   hand hygiene promoted   personal protective equipment utilized   single patient room provided  Goal: Optimal Comfort and Wellbeing  Outcome: Met  Intervention: Monitor Pain and Promote Comfort  Recent Flowsheet Documentation  Taken 3/12/2024 1615 by Chely Burton RN  Pain Management Interventions:   care clustered   quiet environment  facilitated   position adjusted   pillow support provided  Taken 3/12/2024 1215 by Chely Burton RN  Pain Management Interventions: care clustered  Taken 3/12/2024 0815 by Chely Burton RN  Pain Management Interventions:   care clustered   see MAR  Intervention: Provide Person-Centered Care  Recent Flowsheet Documentation  Taken 3/12/2024 1615 by Chely Burton RN  Trust Relationship/Rapport:   choices provided   care explained   emotional support provided   empathic listening provided   questions answered   questions encouraged   thoughts/feelings acknowledged  Taken 3/12/2024 1215 by Chely Burton RN  Trust Relationship/Rapport:   care explained   choices provided   emotional support provided   empathic listening provided   questions answered   thoughts/feelings acknowledged  Taken 3/12/2024 0815 by Chely Burton RN  Trust Relationship/Rapport:   choices provided   care explained   emotional support provided   empathic listening provided   questions answered   questions encouraged   thoughts/feelings acknowledged  Goal: Readiness for Transition of Care  Outcome: Met     Problem: Fall Injury Risk  Goal: Absence of Fall and Fall-Related Injury  Outcome: Met  Intervention: Identify and Manage Contributors  Recent Flowsheet Documentation  Taken 3/12/2024 1215 by Chely Burton RN  Medication Review/Management: medications reviewed  Taken 3/12/2024 1000 by Chely Burton RN  Medication Review/Management: medications reviewed  Taken 3/12/2024 0815 by Chely Burton RN  Medication Review/Management: medications reviewed  Intervention: Promote Injury-Free Environment  Recent Flowsheet Documentation  Taken 3/12/2024 1400 by Chely Burton RN  Safety Promotion/Fall Prevention: safety round/check completed  Taken 3/12/2024 1215 by Chely Burton RN  Safety Promotion/Fall Prevention:   activity supervised   assistive device/personal items within reach   clutter free environment maintained   fall  prevention program maintained   nonskid shoes/slippers when out of bed   room organization consistent   safety round/check completed  Taken 3/12/2024 1000 by Chely Burton RN  Safety Promotion/Fall Prevention:   activity supervised   assistive device/personal items within reach   clutter free environment maintained   fall prevention program maintained   nonskid shoes/slippers when out of bed   room organization consistent   safety round/check completed  Taken 3/12/2024 0815 by Chely Burton RN  Safety Promotion/Fall Prevention:   activity supervised   assistive device/personal items within reach   clutter free environment maintained   fall prevention program maintained   nonskid shoes/slippers when out of bed   room organization consistent   safety round/check completed     Problem: Adjustment to Illness (Stroke, Ischemic/Transient Ischemic Attack)  Goal: Optimal Coping  Outcome: Met  Intervention: Support Psychosocial Response to Stroke  Recent Flowsheet Documentation  Taken 3/12/2024 1615 by Chely Burton, RN  Family/Support System Care:   support provided   self-care encouraged  Taken 3/12/2024 1215 by Chely Burton RN  Family/Support System Care:   support provided   self-care encouraged     Problem: Bowel Elimination Impaired (Stroke, Ischemic/Transient Ischemic Attack)  Goal: Effective Bowel Elimination  Outcome: Met     Problem: Cerebral Tissue Perfusion (Stroke, Ischemic/Transient Ischemic Attack)  Goal: Optimal Cerebral Tissue Perfusion  Outcome: Met  Intervention: Protect and Optimize Cerebral Perfusion  Recent Flowsheet Documentation  Taken 3/12/2024 1615 by Chely Burton, RN  Sensory Stimulation Regulation: care clustered  Cerebral Perfusion Promotion: blood pressure monitored  Taken 3/12/2024 1215 by Chely Burton, RN  Sensory Stimulation Regulation: care clustered  Cerebral Perfusion Promotion: blood pressure monitored  Taken 3/12/2024 0815 by Chely Burton, RN  Sensory Stimulation  Regulation:   care clustered   quiet environment promoted  Cerebral Perfusion Promotion: blood pressure monitored     Problem: Cognitive Impairment (Stroke, Ischemic/Transient Ischemic Attack)  Goal: Optimal Cognitive Function  Outcome: Met  Intervention: Optimize Cognitive Function  Recent Flowsheet Documentation  Taken 3/12/2024 1615 by Chely Burton RN  Sensory Stimulation Regulation: care clustered  Reorientation Measures: clock in view  Taken 3/12/2024 1215 by Chely Burton RN  Sensory Stimulation Regulation: care clustered  Reorientation Measures: clock in view  Taken 3/12/2024 0815 by Chely Burton RN  Sensory Stimulation Regulation:   care clustered   quiet environment promoted  Reorientation Measures: clock in view     Problem: Communication Impairment (Stroke, Ischemic/Transient Ischemic Attack)  Goal: Improved Communication Skills  Outcome: Met  Intervention: Optimize Communication Skills  Recent Flowsheet Documentation  Taken 3/12/2024 1615 by Cheyl Burton RN  Communication Enhancement Strategies: call light answered in person  Taken 3/12/2024 1215 by Chely Burton RN  Communication Enhancement Strategies: call light answered in person  Taken 3/12/2024 0815 by Chely Burton RN  Communication Enhancement Strategies: call light answered in person     Problem: Functional Ability Impaired (Stroke, Ischemic/Transient Ischemic Attack)  Goal: Optimal Functional Ability  Outcome: Met  Intervention: Optimize Functional Ability  Recent Flowsheet Documentation  Taken 3/12/2024 1615 by Chely Burton RN  Activity Management: up ad davin  Taken 3/12/2024 1215 by Chely Burton RN  Activity Management: up ad davin  Taken 3/12/2024 1000 by Chely uBrton RN  Activity Management: up ad davin  Taken 3/12/2024 0815 by Chely uBrton RN  Activity Management: up ad davin     Problem: Respiratory Compromise (Stroke, Ischemic/Transient Ischemic Attack)  Goal: Effective Oxygenation and Ventilation  Outcome:  Met  Intervention: Optimize Oxygenation and Ventilation  Recent Flowsheet Documentation  Taken 3/12/2024 1615 by Chely Burton RN  Airway/Ventilation Management: airway patency maintained  Taken 3/12/2024 1215 by Chely Burton RN  Airway/Ventilation Management: airway patency maintained  Taken 3/12/2024 0815 by Chely Burton, JEANA  Airway/Ventilation Management: airway patency maintained     Problem: Sensorimotor Impairment (Stroke, Ischemic/Transient Ischemic Attack)  Goal: Improved Sensorimotor Function  Outcome: Met     Problem: Swallowing Impairment (Stroke, Ischemic/Transient Ischemic Attack)  Goal: Optimal Eating and Swallowing without Aspiration  Outcome: Met     Problem: Urinary Elimination Impaired (Stroke, Ischemic/Transient Ischemic Attack)  Goal: Effective Urinary Elimination  Outcome: Met   Goal Outcome Evaluation:

## 2024-03-12 NOTE — CONSULTS
Primary Care Provider: Provider, No Known     Consult requested by:  Dr. Murcia     Reason for Consultation: Neurological evaluation      History taken from: patient chart RN    Chief complaint: vertigo       SUBJECTIVE:    History of present illness: Background per H&P: Sue Chen is a 68 y.o. year old female who presented to the ED on 3/11/24 with c/o sudden onset of dizziness, described as room spinning and being off balance this morning.  There was no headache.  There is been associated nausea but no vomiting.  She felt well when she went to bed last night and has had no recent illnesses.  She has been eating and drinking normally.  She has no prior history of vertigo.  In the ER she was noted to have nystagmus.  Since arrival in the ER she feels better and no longer has dizziness at rest.  She continues to be off balance if she turns her head or attempts to sit up.  Initial workup, including labs, EKG, head CT and CTA has been unremarkable except for incidental finding of ectasia of the thoracic aorta.     CTA head/neck and MRI brain were unremarkable. VSS except a few Bps down to the 90s systolic.   - Portions of the above HPI were copied from previous encounters and edited as appropriate. PMH as detailed below.     Pt states vertigo has improved. No focal deficits, ataxia, or vision changes. She denies any falls or head injury prior to the onset of vertigo. She has a headache (5/10) that is more posterior and described as a tension headache. Denies ear pain, tinnitus, drainage, or hearing loss.     Review of Systems   Constitutional:  Negative for chills, diaphoresis and fatigue.   Eyes:  Negative for photophobia and visual disturbance.   Neurological:  Positive for dizziness. Negative for tremors, seizures, syncope, facial asymmetry, speech difficulty, weakness, light-headedness, numbness and headaches.          PATIENT HISTORY:  Past Medical History:   Diagnosis Date    Arthritis     Cancer     Sleep apnea     ,   Past Surgical History:   Procedure Laterality Date    BILATERAL BREAST REDUCTION Bilateral     CHEILECTOMY Left 2024    Procedure: HALLUX RIGIDUS CORRECTION LEFT;  Surgeon: Yann Troncoso DPM;  [place] [facility] [place]  Service: Podiatry;  Laterality: Left;    CHOLECYSTECTOMY      HYSTERECTOMY      SKIN CANCER EXCISION      on face    TONSILLECTOMY      TOTAL ABDOMINAL HYSTERECTOMY WITH SALPINGO OOPHORECTOMY Bilateral 2004    TUBAL ABDOMINAL LIGATION     , History reviewed. No pertinent family history.,   Social History     Tobacco Use    Smoking status: Former     Current packs/day: 0.00     Average packs/day: 1 pack/day for 12.0 years (12.0 ttl pk-yrs)     Types: Cigarettes     Start date:      Quit date:      Years since quittin.2    Smokeless tobacco: Never   Vaping Use    Vaping status: Never Used   Substance Use Topics    Alcohol use: Yes     Comment: socially    Drug use: Never   ,   Prior to Admission medications    Medication Sig Start Date End Date Taking? Authorizing Provider   escitalopram (LEXAPRO) 20 MG tablet Take 1 tablet by mouth Daily.   Yes Keiry Foley MD   meclizine (ANTIVERT) 25 MG tablet Take 1 tablet by mouth 3 (Three) Times a Day As Needed for Dizziness. 3/12/24  Yes Flores Alston APRN   spironolactone (ALDACTONE) 50 MG tablet Take 1 tablet by mouth Daily. 3/12/24  Yes Flores Alston APRN   spironolactone (ALDACTONE) 50 MG tablet Take 1 tablet by mouth 2 (Two) Times a Day. 1/7/24 3/12/24 Yes Keiry Foley MD   carboxymethylcellulose sod 1 % gel eye gel Administer 1 drop to both eyes 3 (Three) Times a Day As Needed (Dry eyes).    Keiry Foley MD   docusate sodium (COLACE) 50 MG capsule Take 1 capsule by mouth Daily As Needed for Constipation.    Keiry Foley MD    Allergies:  Codeine    Current Facility-Administered Medications   Medication Dose Route Frequency Provider Last Rate Last Admin    acetaminophen (TYLENOL) tablet  650 mg  650 mg Oral Q6H PRN Leatha Murcia MD   650 mg at 03/12/24 0841    atorvastatin (LIPITOR) tablet 80 mg  80 mg Oral Nightly Leatha Murcia MD   80 mg at 03/11/24 2101    escitalopram (LEXAPRO) tablet 20 mg  20 mg Oral Daily Leatha Murcia MD   20 mg at 03/12/24 0840    meclizine (ANTIVERT) tablet 25 mg  25 mg Oral TID PRN Leatha Murcia MD   25 mg at 03/12/24 0840    ondansetron (ZOFRAN) injection 4 mg  4 mg Intravenous Q6H PRN Leatha Murcia MD        polyethyl glycol-propyl glycol (SYSTANE) 0.4-0.3 % ophthalmic solution (artificial tears) 1 drop  1 drop Both Eyes Q3H PRN Leatha Murcia MD        sodium chloride 0.9 % flush 10 mL  10 mL Intravenous PRN Leatha Murcia MD        sodium chloride 0.9 % flush 10 mL  10 mL Intravenous Q12H Leatha Murcia MD   10 mL at 03/12/24 0849    sodium chloride 0.9 % flush 10 mL  10 mL Intravenous PRN Leatha Murcia MD        sodium chloride 0.9 % flush 10 mL  10 mL Intravenous Q12H Leatha Murcia MD   10 mL at 03/12/24 0849    sodium chloride 0.9 % flush 10 mL  10 mL Intravenous PRN Leatha Murcia MD        sodium chloride 0.9 % infusion 40 mL  40 mL Intravenous PRN Leatha Murcia MD        sodium chloride 0.9 % infusion 40 mL  40 mL Intravenous PRN Leatha Murcia MD        sodium chloride 0.9 % infusion  100 mL/hr Intravenous Continuous Leatha Murcia MD            ________________________________________________________        OBJECTIVE:  Upon today's exam, pt is awake and alert. Daughter at BS.      Neurologic Exam  PHYSICAL EXAM:    CONSTITUTIONAL: The patient is in no apparent distress, bright awake and alert.      HEENT: There is no tenderness over the temporal arteries bilaterally. Normocephalic, atraumatic. TMJ open symmetrically without tenderness. Maybrook-Hallpike positive to the right     NECK: ROM normal, supple    RESPIRATORY: Breathing unlabored, Breath sounds are normal    CARDIAC: Regular rate and rhythm.     EXTREMITIES:  No clubbing, cyanosis or edema.    PSYCHIATRIC: Mood/affect normal,  judgement normal, appropriate    NEUROLOGICAL:    Cognition:   Fully oriented.  Fund of knowledge excellent.  Concentration and attention normal.   Language normal with normal comprehension, fluent speech, intact repetition and naming.   Short and long term memory appears intact    Cranial nerves;    II - pupils bilaterally equal reacting to light,  No new Visual field deficits;  Fundoscopic exam- Not able to be done, non-dilated exam  III,IV,VI: EOMI with no diplopia  V: Normal facial sensations  VII: No facial asymmetry,  VIII: No New hearing abnormality  IX, X, XI: normal gag and shoulder shrug;  XII: tongue is in the midline.    Sensory:  Intact to light touch in all extremities.     Motor: Strength 5/5 bilaterally upper and lower extremities. No involuntary movements present. Normal tone and bulk.  Deep tendon reflexes: 2/4 and symmetrical in biceps, brachioradialis, triceps, bilateral 2/4 knees and ankles. Both plantars are flexor.    Cerebellar: Finger to nose and mirror movements normal bilaterally.    Gait and balance: deferred    ________________________________________________________   RESULTS REVIEW:    VITAL SIGNS:   Temp:  [98 °F (36.7 °C)-98.3 °F (36.8 °C)] 98.3 °F (36.8 °C)  Heart Rate:  [66-89] 73  Resp:  [16-20] 16  BP: ()/(70-87) 136/87     LABS:      Lab 03/11/24  1207   WBC 6.40   HEMOGLOBIN 13.4   HEMATOCRIT 40.5   PLATELETS 189   NEUTROS ABS 4.50   LYMPHS ABS 1.30   MONOS ABS 0.40   EOS ABS 0.10   MCV 86.5   PROTIME 11.8*   APTT <20.0*         Lab 03/11/24  1207   SODIUM 140   POTASSIUM 4.3   CHLORIDE 103   CO2 25.0   ANION GAP 12.0   BUN 17   CREATININE 0.79   EGFR 82.1   GLUCOSE 112*   CALCIUM 9.8   HEMOGLOBIN A1C 5.70*         Lab 03/11/24  1207   TOTAL PROTEIN 6.8   ALBUMIN 4.2   GLOBULIN 2.6   ALT (SGPT) 17   AST (SGOT) 16   BILIRUBIN 0.2   ALK PHOS 59         Lab 03/11/24  1207   HSTROP T 9   PROTIME 11.8*   INR 1.09         Lab 03/11/24  1207   CHOLESTEROL 195   LDL CHOL 130*    HDL CHOL 51   TRIGLYCERIDES 74         Lab 03/11/24  1207   ABO TYPING O   RH TYPING Negative   ANTIBODY SCREEN Negative             Lab Results   Component Value Date     (H) 03/11/2024    HGBA1C 5.70 (H) 03/11/2024       IMAGING STUDIES:  MRI Brain Without Contrast    Result Date: 3/12/2024  Impression: Minimal periventricular white matter change which could reflect more chronic small vessel ischemic change. Electronically Signed: Oscar Estrada MD  3/12/2024 9:43 AM EDT  Workstation ID: [id number]    XR Chest 1 View    Result Date: 3/11/2024  Impression: No acute chest findings. Generalized ectasia of the thoracic aorta. Electronically Signed: Livier Cameron MD  3/11/2024 1:41 PM EDT  Workstation ID: [id number]    CT Angiogram Head w AI Analysis of LVO    Result Date: 3/11/2024  Impression: CT angiogram of the head and neck demonstrates no evidence of flow-limiting stenosis, large vessel occlusion or aneurysm. Incidentally noted and partially imaged borderline aneurysmal ectasia of the ascending thoracic aorta, measuring up to 4.0 cm in greatest visualized transverse dimension. Consider nonemergent CT angiogram of the chest if not previously performed. Electronically Signed: Miguelito Chang MD  3/11/2024 1:37 PM EDT  Workstation ID: [id number]    CT Angiogram Neck    Result Date: 3/11/2024  Impression: CT angiogram of the head and neck demonstrates no evidence of flow-limiting stenosis, large vessel occlusion or aneurysm. Incidentally noted and partially imaged borderline aneurysmal ectasia of the ascending thoracic aorta, measuring up to 4.0 cm in greatest visualized transverse dimension. Consider nonemergent CT angiogram of the chest if not previously performed. Electronically Signed: Miguelito Chang MD  3/11/2024 1:37 PM EDT  Workstation ID: [id number]    CT Head Without Contrast Stroke Protocol    Result Date: 3/11/2024  Impression: No acute intracranial finding. Questionable left carotid siphon aneurysm versus  other extra-axial lesion such as a meningioma. Correlate with CT findings. Electronically Signed: Livier Cameron MD  3/11/2024 1:11 PM EDT  Workstation ID: [id number]     I reviewed the patient's new clinical results.    ________________________________________________________     PROBLEM LIST:    Dizziness            ASSESSMENT/PLAN:  1. Vertigo, BPPV that has improved. Logan-Hallpike positive to the right. She has had some hypotension with pressures down to the 90s which can cause lightheadedness, but rarely causes vertigo. CTA does not show any signs of carotid or vertebrobasilar stenosis. MRI brain negative.   - CT head: No acute intracranial finding. Questionable left carotid siphon aneurysm versus other extra-axial lesion such as a meningioma. Correlate with CT findings.   - CTA head and neck: CT angiogram of the head and neck demonstrates no evidence of flow-limiting stenosis, large vessel occlusion or aneurysm.   - MRI brain: Minimal periventricular white matter change which could reflect more chronic small vessel ischemic change.   - Labs: A1C: 5.7, B12: P, LDL: 130, TSH: P  - Orthostatic vitals  - PT/OT/ST as appropriate, fall precautions as appropriate, Neuro checks per protocol, DVT prophylaxis, Stroke education  - PT for Epley. Some pts require outpt vestibular rehab   - Meclizine PRN. If no improvement, can try short course of klonopin 0.25mg BID.     2. Headache, tension type. Probable occipital neuralgia on the left.   - Baclofen 10mg BID PRN  - Fioricet 2 tabs BID PRN  - Discussed options for occipital nerve block, pt declined.     3. Mild hypotension  - Orthostatic vitals unremarkable   - Spironolactone has been d/cd  - If pt becomes symptomatic, recommend outpt cardiac evaluation     4.  Borderline aneurysmal ectasia of the ascending thoracic aorta, measuring up to 4.0 cm in greatest visualized transverse dimension (incidentally noted on CTA)  - Consider nonemergent CT angiogram of the chest if not  previously performed.     Modification of stroke risk factors:   - Blood pressure should be less than 130/80 outpatient, HbA1c less than 6.5, LDL less than 70; b12>500 and smoking cessation if applicable. We would be grateful if the primary team / primary care physician would keep a close watch on the above targets.  - Stroke education  - Follow up with neurologist of choice    Will sign off. Please call with questions or concerns.     I discussed the patient's findings and my recommendations with patient, nursing staff, and consulting provider    FRANNY Anderson  03/12/24  12:41 EDT

## 2024-03-12 NOTE — PLAN OF CARE
Problem: Adult Inpatient Plan of Care  Goal: Plan of Care Review  Outcome: Ongoing, Progressing  Goal: Patient-Specific Goal (Individualized)  Outcome: Ongoing, Progressing  Goal: Absence of Hospital-Acquired Illness or Injury  Outcome: Ongoing, Progressing  Intervention: Identify and Manage Fall Risk  Recent Flowsheet Documentation  Taken 3/12/2024 0600 by Cindy Ahumada RN  Safety Promotion/Fall Prevention:   activity supervised   assistive device/personal items within reach   clutter free environment maintained   fall prevention program maintained   safety round/check completed   room organization consistent  Taken 3/12/2024 0400 by Cindy Ahumada RN  Safety Promotion/Fall Prevention:   activity supervised   assistive device/personal items within reach   clutter free environment maintained   fall prevention program maintained   safety round/check completed   room organization consistent  Taken 3/12/2024 0200 by Cindy Ahumada RN  Safety Promotion/Fall Prevention:   activity supervised   assistive device/personal items within reach   clutter free environment maintained   fall prevention program maintained   safety round/check completed   room organization consistent  Taken 3/12/2024 0000 by Cindy Ahumada RN  Safety Promotion/Fall Prevention:   activity supervised   assistive device/personal items within reach   clutter free environment maintained   fall prevention program maintained   safety round/check completed   room organization consistent  Taken 3/11/2024 2300 by Cindy Ahumada RN  Safety Promotion/Fall Prevention:   activity supervised   assistive device/personal items within reach   clutter free environment maintained   fall prevention program maintained   safety round/check completed   room organization consistent  Intervention: Prevent Skin Injury  Recent Flowsheet Documentation  Taken 3/12/2024 0400 by Cindy Ahumada RN  Body Position: position changed independently  Taken  3/12/2024 0000 by Cindy Ahumada RN  Body Position: position changed independently  Taken 3/11/2024 2300 by Cindy Ahumada RN  Body Position: position changed independently  Intervention: Prevent and Manage VTE (Venous Thromboembolism) Risk  Recent Flowsheet Documentation  Taken 3/11/2024 2300 by Cindy Ahumada RN  VTE Prevention/Management:   sequential compression devices off   patient refused intervention  Intervention: Prevent Infection  Recent Flowsheet Documentation  Taken 3/12/2024 0600 by Cindy Ahumada RN  Infection Prevention:   environmental surveillance performed   equipment surfaces disinfected   hand hygiene promoted   personal protective equipment utilized   rest/sleep promoted   single patient room provided  Taken 3/12/2024 0400 by Cindy Ahumada RN  Infection Prevention:   environmental surveillance performed   equipment surfaces disinfected   hand hygiene promoted   personal protective equipment utilized   rest/sleep promoted   single patient room provided  Taken 3/12/2024 0200 by Cindy Ahumada RN  Infection Prevention:   environmental surveillance performed   equipment surfaces disinfected   hand hygiene promoted   personal protective equipment utilized   rest/sleep promoted   single patient room provided  Taken 3/12/2024 0000 by Cindy Ahumada RN  Infection Prevention:   single patient room provided   personal protective equipment utilized   rest/sleep promoted   hand hygiene promoted   equipment surfaces disinfected   environmental surveillance performed  Taken 3/11/2024 2300 by Cindy Ahumada RN  Infection Prevention:   single patient room provided   rest/sleep promoted   personal protective equipment utilized   hand hygiene promoted   equipment surfaces disinfected   environmental surveillance performed  Goal: Optimal Comfort and Wellbeing  Outcome: Ongoing, Progressing  Intervention: Monitor Pain and Promote Comfort  Recent Flowsheet Documentation  Taken  3/11/2024 2300 by Cindy Ahumada, RN  Pain Management Interventions: position adjusted  Intervention: Provide Person-Centered Care  Recent Flowsheet Documentation  Taken 3/11/2024 2300 by Cindy Ahumada RN  Trust Relationship/Rapport:   care explained   choices provided   questions answered   thoughts/feelings acknowledged   reassurance provided  Goal: Readiness for Transition of Care  Outcome: Ongoing, Progressing   Goal Outcome Evaluation:

## 2024-03-12 NOTE — THERAPY EVALUATION
Patient Name: uSe Chen  : 1955    MRN: [id number]                              Today's Date: 3/12/2024       Admit Date: 3/11/2024    Visit Dx:     ICD-10-CM ICD-9-CM   1. Dizziness  R42 780.4     Patient Active Problem List   Diagnosis    Aneurysm of ascending aorta without rupture    Elevated blood pressure reading    LILI on CPAP    Family history of thoracic aortic aneurysm    Dizziness     Past Medical History:   Diagnosis Date    Arthritis     Cancer     Sleep apnea      Past Surgical History:   Procedure Laterality Date    BILATERAL BREAST REDUCTION Bilateral     CHEILECTOMY Left 2024    Procedure: HALLUX RIGIDUS CORRECTION LEFT;  Surgeon: Yann Troncoso DPM;  Location: [facility] [place];  Service: Podiatry;  Laterality: Left;    CHOLECYSTECTOMY      HYSTERECTOMY      SKIN CANCER EXCISION      on face    TONSILLECTOMY      TOTAL ABDOMINAL HYSTERECTOMY WITH SALPINGO OOPHORECTOMY Bilateral 2004    TUBAL ABDOMINAL LIGATION        General Information       Row Name 24 1445          OT Time and Intention    Document Type evaluation  -LS     Mode of Treatment occupational therapy  -       Row Name 24 1445          General Information    Patient Profile Reviewed yes  -LS     Prior Level of Function independent:;ADL's;driving;all household mobility;cooking;home management;cleaning  -LS     Existing Precautions/Restrictions no known precautions/restrictions  -LS     Barriers to Rehab none identified  -LS       Row Name 24 1445          Living Environment    People in Home spouse  -LS       Row Name 24 1445          Home Main Entrance    Number of Stairs, Main Entrance two  -LS     Stair Railings, Main Entrance none  -LS       Row Name 24 1445          Stairs Within Home, Primary    Number of Stairs, Within Home, Primary none  -LS       Row Name 24 1448          Cognition    Orientation Status (Cognition) oriented x 4  -LS       Row Name 24 1449           Safety Issues, Functional Mobility    Impairments Affecting Function (Mobility) balance  -               User Key  (r) = Recorded By, (t) = Taken By, (c) = Cosigned By      Initials Name Provider Type    Luis Leone OT Occupational Therapist                     Mobility/ADL's       Row Name 03/12/24 1451          Bed Mobility    Bed Mobility bed mobility (all) activities  -     All Activities, [place] (Bed Mobility) independent  -     Assistive Device (Bed Mobility) bed rails;head of bed elevated  -       Row Name 03/12/24 1451          Transfers    Transfers sit-stand transfer  -       Row Name 03/12/24 1451          Sit-Stand Transfer    Sit-Stand [place] (Transfers) standby assist  -       Row Name 03/12/24 1451          Functional Mobility    Functional Mobility- Ind. Level supervision required  -     Patient was able to Ambulate yes  -       Row Name 03/12/24 1451          Activities of Daily Living    BADL Assessment/Intervention toileting;lower body dressing  -       Row Name 03/12/24 1451          Toileting Assessment/Training    [place] Level (Toileting) toileting skills;supervision  -       Row Name 03/12/24 1451          Lower Body Dressing Assessment/Training    [place] Level (Lower Body Dressing) lower body dressing skills;modified independence  -     Position (Lower Body Dressing) edge of bed sitting  -               User Key  (r) = Recorded By, (t) = Taken By, (c) = Cosigned By      Initials Name Provider Type    Luis Leone OT Occupational Therapist                   Obj/Interventions       Row Name 03/12/24 1455          Sensory Assessment (Somatosensory)    Sensory Assessment (Somatosensory) sensation intact  -       Row Name 03/12/24 1455          Range of Motion Comprehensive    Comment, General Range of Motion L shoulder 50% AROm d/t pain - surgery in ~1 month  -       Row Name 03/12/24 1455          Strength Comprehensive (MMT)     Comment, General Manual Muscle Testing (MMT) Assessment L shoulder 2/5, otherwise grossly WNL  -LS       Row Name 03/12/24 1164          Balance    Balance Assessment sitting static balance;sitting dynamic balance;standing static balance;standing dynamic balance  -LS     Static Sitting Balance independent  -LS     Dynamic Sitting Balance standby assist  -LS     Position, Sitting Balance sitting edge of bed;unsupported  -LS     Static Standing Balance standby assist  -LS     Dynamic Standing Balance contact guard  -LS     Position/Device Used, Standing Balance unsupported  -LS               User Key  (r) = Recorded By, (t) = Taken By, (c) = Cosigned By      Initials Name Provider Type    Luis Leone OT Occupational Therapist                   Goals/Plan    No documentation.                  Clinical Impression       Row Name 03/12/24 1506          Pain Assessment    Pretreatment Pain Rating 0/10 - no pain  -LS     Posttreatment Pain Rating 0/10 - no pain  -LS       Row Name 03/12/24 1505          Plan of Care Review    Plan of Care Reviewed With patient;spouse;daughter  -LS     Outcome Evaluation Pt is a 69 y/o F admitted to [facility] ED on 3/11/24 with complaints of syncopal episode earlier this AM when performing laundry. She reports that she get very dizzy, causing her to fall and hit her head. CT Head (-), CTA Head/Neck (-), and XR Chest (-). Pt is currently being followed for dizziness. Physical therapy saw yesterday, believing there is not vestibular involvement but will continue to follow for further testing. At baseline, pt lives with her spouse and is IND with ADLs and mobility without AD. She is an active person, cooks, cleans the house, and is an active . This date, she is oriented x4. She reports no dizziness when in supine. Bed mobility completed wit Mod I for excess time, care taken to maintain head as still as possible. In standing, still no dizziness and no change in BP. Pt ambulated slowly and  with SBA approx 100 feet, then completed toileting without need for assistance. She moved carefully and slowly throughout, for fear that balance would be thrown off with onset of dizziness. She remains IND with ADLs, but would benefit from the use of a RW at home for safety while symptoms persist. OT will sign off.  -       Row Name 03/12/24 1506          Therapy Assessment/Plan (OT)    Criteria for Skilled Therapeutic Interventions Met (OT) no  -LS     Therapy Frequency (OT) evaluation only  -LS     Predicted Duration of Therapy Intervention (OT) eval only  -       Row Name 03/12/24 1506          Therapy Plan Review/Discharge Plan (OT)    Equipment Needs Upon Discharge (OT) walker, rolling  -LS     Anticipated Discharge Disposition (OT) home with outpatient therapy services  -       Row Name 03/12/24 1506          Vital Signs    O2 Delivery Pre Treatment room air  -LS     O2 Delivery Intra Treatment room air  -LS     O2 Delivery Post Treatment room air  -LS     Pre Patient Position Supine  -LS     Intra Patient Position Standing  -LS     Post Patient Position Sitting  -LS       Row Name 03/12/24 1506          Positioning and Restraints    Pre-Treatment Position in bed  -LS     Post Treatment Position bed  -LS     In Bed notified nsg;fowlers;call light within reach;encouraged to call for assist;with family/caregiver  -               User Key  (r) = Recorded By, (t) = Taken By, (c) = Cosigned By      Initials Name Provider Type    Luis Leone, SARAI Occupational Therapist                   Outcome Measures       Row Name 03/12/24 2797          How much help from another is currently needed...    Putting on and taking off regular lower body clothing? 4  -LS     Bathing (including washing, rinsing, and drying) 4  -LS     Toileting (which includes using toilet bed pan or urinal) 4  -LS     Putting on and taking off regular upper body clothing 4  -LS     Taking care of personal grooming (such as brushing teeth)  4  -LS     Eating meals 4  -LS     AM-PAC 6 Clicks Score (OT) 24  -LS       Row Name 03/12/24 0815          How much help from another person do you currently need...    Turning from your back to your side while in flat bed without using bedrails? 4  -BL     Moving from lying on back to sitting on the side of a flat bed without bedrails? 4  -BL     Moving to and from a bed to a chair (including a wheelchair)? 4  -BL     Standing up from a chair using your arms (e.g., wheelchair, bedside chair)? 4  -BL     Climbing 3-5 steps with a railing? 4  -BL     To walk in hospital room? 4  -BL     AM-PAC 6 Clicks Score (PT) 24  -BL     Highest Level of Mobility Goal 8 --> Walked 250 feet or more  -       Row Name 03/12/24 1512          Functional Assessment    Outcome Measure Options AM-PAC 6 Clicks Daily Activity (OT)  -               User Key  (r) = Recorded By, (t) = Taken By, (c) = Cosigned By      Initials Name Provider Type     Chely Burton RN Registered Nurse    Luis Leone OT Occupational Therapist                    Occupational Therapy Education       Title: PT OT SLP Therapies (Done)       Topic: Occupational Therapy (Done)       Point: ADL training (Done)       Description:   Instruct learner(s) on proper safety adaptation and remediation techniques during self care or transfers.   Instruct in proper use of assistive devices.                  Learning Progress Summary             Patient Acceptance, E,TB, VU by  at 3/12/2024 1512   Family Acceptance, E,TB, VU by  at 3/12/2024 1512                                         User Key       Initials Effective Dates Name Provider Type Discipline     09/22/22 -  Luis Edgar OT Occupational Therapist OT                  OT Recommendation and Plan  Therapy Frequency (OT): evaluation only  Plan of Care Review  Plan of Care Reviewed With: patient, spouse, daughter  Outcome Evaluation: Pt is a 69 y/o F admitted to [facility] ED on 3/11/24 with complaints of  syncopal episode earlier this AM when performing laundry. She reports that she get very dizzy, causing her to fall and hit her head. CT Head (-), CTA Head/Neck (-), and XR Chest (-). Pt is currently being followed for dizziness. Physical therapy saw yesterday, believing there is not vestibular involvement but will continue to follow for further testing. At baseline, pt lives with her spouse and is IND with ADLs and mobility without AD. She is an active person, cooks, cleans the house, and is an active . This date, she is oriented x4. She reports no dizziness when in supine. Bed mobility completed wit Mod I for excess time, care taken to maintain head as still as possible. In standing, still no dizziness and no change in BP. Pt ambulated slowly and with SBA approx 100 feet, then completed toileting without need for assistance. She moved carefully and slowly throughout, for fear that balance would be thrown off with onset of dizziness. She remains IND with ADLs, but would benefit from the use of a RW at home for safety while symptoms persist. OT will sign off.     Time Calculation:         Time Calculation- OT       Row Name 03/12/24 1513             Time Calculation- OT    OT Start Time 1137  -LS      OT Stop Time 1205  -LS      OT Time Calculation (min) 28 min  -LS      OT Received On 03/12/24  -LS         Untimed Charges    OT Eval/Re-eval Minutes 28  -LS         Total Minutes    Untimed Charges Total Minutes 28  -LS       Total Minutes 28  -LS                User Key  (r) = Recorded By, (t) = Taken By, (c) = Cosigned By      Initials Name Provider Type     Luis Edgar OT Occupational Therapist                  Therapy Charges for Today       Code Description Service Date Service Provider Modifiers Qty    06181011000  OT EVAL LOW COMPLEXITY 4 3/12/2024 Luis Edgar OT GO 1                 Luis Edgar OT  3/12/2024

## 2024-03-12 NOTE — DISCHARGE PLACEMENT REQUEST
"Sue Maria (68 y.o. Female)       Date of Birth   1955    Social Security Number       Address   [address] [place] [facility]    Home Phone   [phone]    MRN   [id number]       Yarsanism   None    Marital Status                               Admission Date   3/11/24    Admission Type   Emergency    Admitting Provider   Leatha Murcia MD    Attending Provider   Leatha Murcia MD    Department, Room/Bed   [facility] NEURO, 249/1       Discharge Date       Discharge Disposition   Home-Health Care [facility] [place]    Discharge Destination                                 Attending Provider: Leatha Murcia MD    Allergies: Codeine    Isolation: None   Infection: None   Code Status: CPR    Ht: 165.1 cm (65\")   Wt: 80 kg (176 lb 5.9 oz)    Admission Cmt: None   Principal Problem: Dizziness [R42]                   Active Insurance as of 3/11/2024       Primary Coverage       Payor Plan Insurance Group Employer/Plan Group    MEDICARE RAILROAD MEDICARE        Payor Plan Address Payor Plan Phone Number Payor Plan Fax Number Effective Dates    [address] [phone]  12/1/2021 - None Entered    [facility], [facility] [address]         Subscriber Name Subscriber Birth Date Member ID       SUE MARIA 1955 [id number]               Secondary Coverage       Payor Plan Insurance Group Employer/Plan Group    [employer] [place]        Payor Plan Address Payor Plan Phone Number Payor Plan Fax Number Effective Dates    [address] [phone]  12/1/2021 - None Entered    [facility] [id number]         Subscriber Name Subscriber Birth Date Member ID       SUE MARIA 1955 [id number]                     Emergency Contacts        (Rel.) Home Phone Work Phone Mobile Phone    STAN MATA (Spouse) -- -- [phone]                "

## 2024-03-12 NOTE — THERAPY TREATMENT NOTE
Subjective: Pt agreeable to therapeutic plan of care. Pt completed two separate sessions this date to perform canalith repositioning as well as assess mobility with use of RW.    Objective:     Bed mobility - Independent with multiple supine<>sit EOB  Transfers - SBA and with rolling walker  Ambulation - 50 feet SBA, CGA, and with rolling walker    Vitals: WNL    Pain: 0 VAS   Location: N/A  Intervention for pain: N/A    Education: Provided education on the importance of mobility in the acute care setting, Verbal/Tactile Cues, Transfer Training, and Gait Training    Assessment: Sue Chen presents with functional mobility impairments which indicate the need for skilled intervention. Tolerating session today without incident. PT performed two separate sessions this PM on follow-up for dizziness symptoms and assessment of BPPV from initial evaluation performed yesterday. During today's session, Pt exhibited very mild torsional nystagmus with the [place]-Hallpike performed on the R. However, she was also (+) for L torsional nystagmus when performing the [place]-Hallpike on the L (higher velocity and amplitude when compared to the R). She was initially treated with the Epley maneuver x 1 to treat the L side with slight improvement in symptoms. She was then treated with the Semont maneuver over the potential concern over L-sided cupulolithiasis. Semont maneuver was performed x 2 this date with moderate improvement in symptoms. Following treatment, pt still exhibited mild nystagmus with [place]-Hallpike performed on the L, but decreased amplitude and frequency from when previously tested. Her symptoms seem to be improving significantly following treatment sessions. Extensive amount of education was provided to patient and family regarding current condition and she was provided with handout, instructed to provide to OPPT for current diagnosis. She will be safe to d/c home at this time with follow-up with OPPT vestibular therapy for  "continued treatment if symptoms do not resolve. She as able to ambulate safely with RW at this time and was provided prior to d/c.     Plan/Recommendations:   If medically appropriate, Low Intensity Therapy recommended post-acute care - This is recommended as therapy feels this patient would require 2-3 visits per week. OP or HH would be the best option depending on patient's home bound status. Consider, if the patient has other  \"skilled\" needs such as wounds, IV antibiotics, etc. Combined with \"low intensity\" could also equate to a SNF. If patient is medically complex, consider LTAC. Pt requires rolling walker at discharge.     Pt desires Outpatient therapy at discharge. Pt cooperative; agreeable to therapeutic recommendations and plan of care.         Basic Mobility 6-click:  Rollin = Total, A lot = 2, A little = 3; 4 = None  Supine>Sit:   1 = Total, A lot = 2, A little = 3; 4 = None   Sit>Stand with arms:  1 = Total, A lot = 2, A little = 3; 4 = None  Bed>Chair:   1 = Total, A lot = 2, A little = 3; 4 = None  Ambulate in room:  1 = Total, A lot = 2, A little = 3; 4 = None  3-5 Steps with railin = Total, A lot = 2, A little = 3; 4 = None  Score: 20    Post-Tx Position: Supine with HOB Elevated and Call light and personal items within reach  PPE: gloves     "

## 2024-03-12 NOTE — PLAN OF CARE
"Assessment: Sue Chen presents with functional mobility impairments which indicate the need for skilled intervention. Tolerating session today without incident. PT performed two separate sessions this PM on follow-up for dizziness symptoms and assessment of BPPV from initial evaluation performed yesterday. During today's session, Pt exhibited very mild torsional nystagmus with the Logan-Hallpike performed on the R. However, she was also (+) for L torsional nystagmus when performing the [place]-Hallpike on the L (higher velocity and amplitude when compared to the R). She was initially treated with the Epley maneuver x 1 to treat the L side with slight improvement in symptoms. She was then treated with the Semont maneuver over the potential concern over L-sided cupulolithiasis. Semont maneuver was performed x 2 this date with moderate improvement in symptoms. Following treatment, pt still exhibited mild nystagmus with [place]-Hallpike performed on the L, but decreased amplitude and frequency from when previously tested. Her symptoms seem to be improving significantly following treatment sessions. Extensive amount of education was provided to patient and family regarding current condition and she was provided with handout, instructed to provide to OPPT for current diagnosis. She will be safe to d/c home at this time with follow-up with OPPT vestibular therapy for continued treatment if symptoms do not resolve. She as able to ambulate safely with RW at this time and was provided prior to d/c.     Plan/Recommendations:   If medically appropriate, Low Intensity Therapy recommended post-acute care - This is recommended as therapy feels this patient would require 2-3 visits per week. OP or HH would be the best option depending on patient's home bound status. Consider, if the patient has other  \"skilled\" needs such as wounds, IV antibiotics, etc. Combined with \"low intensity\" could also equate to a SNF. If patient is medically " complex, consider LTAC. Pt requires rolling walker at discharge.     Pt desires Outpatient therapy at discharge. Pt cooperative; agreeable to therapeutic recommendations and plan of care.

## 2024-03-12 NOTE — DISCHARGE SUMMARY
Date of Discharge:  3/12/2024    Discharge Diagnosis:   Dizziness  Depression  Ectasia thoracic aorta    Presenting Problem/History of Present Illness  Active Hospital Problems    Diagnosis  POA    **Dizziness [R42]  Yes      Resolved Hospital Problems   No resolved problems to display.          Hospital Course    Patient is a 68 y.o. female presented with sudden onset of dizziness, described as room spinning and being off balance this morning.  There was no headache.  There is been associated nausea but no vomiting.  She felt well when she went to bed last night and has had no recent illnesses.  She has been eating and drinking normally.  She has no prior history of vertigo.  In the ER she was noted to have nystagmus.  Since arrival in the ER she feels better and no longer has dizziness at rest.  She continues to be off balance if she turns her head or attempts to sit up.  Initial workup, including labs, EKG, head CT and CTA has been unremarkable except for incidental finding of ectasia of the thoracic aorta measure 4.0 and will need non emergent CT angiogram of chest. She was hydrated overnight and MRI was negative she is feeling improved today. She will have PT at home. PT does not seem to believe there is vestibular involvement with her current condition. PT will continue to follow to determine if canalith repositioning is necessary or if her condition is medically related or it could be a virus. She has an appointment with PCP. She has been hydrated overnight and will decrease spirolactone to daily until seen by PCP.        Procedures Performed         Consults:   Consults       No orders found for last 30 day(s).            Pertinent Test Results:    Lab Results (most recent)       Procedure Component Value Units Date/Time    Lipid Panel [[id number]]  (Abnormal) Collected: 03/11/24 1207    Specimen: Blood Updated: 03/11/24 2036     Total Cholesterol 195 mg/dL      Triglycerides 74 mg/dL      HDL Cholesterol  51 mg/dL      LDL Cholesterol  130 mg/dL      VLDL Cholesterol 14 mg/dL      LDL/HDL Ratio 2.53    Narrative:      Cholesterol Reference Ranges  (U.S. Department of Health and Human Services ATP III Classifications)    Desirable          <200 mg/dL  Borderline High    200-239 mg/dL  High Risk          >240 mg/dL      Triglyceride Reference Ranges  (U.S. Department of Health and Human Services ATP III Classifications)    Normal           <150 mg/dL  Borderline High  150-199 mg/dL  High             200-499 mg/dL  Very High        >500 mg/dL    HDL Reference Ranges  (U.S. Department of Health and Human Services ATP III Classifications)    Low     <40 mg/dl (major risk factor for CHD)  High    >60 mg/dl ('negative' risk factor for CHD)        LDL Reference Ranges  (U.S. Department of Health and Human Services ATP III Classifications)    Optimal          <100 mg/dL  Near Optimal     100-129 mg/dL  Borderline High  130-159 mg/dL  High             160-189 mg/dL  Very High        >189 mg/dL    Hemoglobin A1c [[id number]]  (Abnormal) Collected: 03/11/24 1207    Specimen: Blood Updated: 03/11/24 2031     Hemoglobin A1C 5.70 %     [facility] [[id number]] Collected: 03/11/24 1207    Specimen: Blood Updated: 03/11/24 1315    Narrative:      The following orders were created for panel order [facility].  Procedure                               Abnormality         Status                     ---------                               -----------         ------                     Green Top (Gel)[[id number]]                                  Final result               Lavender Top[[id number]]                                     Final result               Gold Top - SST[[id number]]                                   Final result               Light Blue Top[[id number]]                                   Final result                 Please view results for these tests on the individual orders.    Green Top (Gel) [[id number]] Collected: 03/11/24  1207    Specimen: Blood Updated: 03/11/24 1315     Extra Tube Hold for add-ons.     Comment: Auto resulted.       Lavender Top [[id number]] Collected: 03/11/24 1207    Specimen: Blood Updated: 03/11/24 1315     Extra Tube hold for add-on     Comment: Auto resulted       Gold Top - SST [[id number]] Collected: 03/11/24 1207    Specimen: Blood Updated: 03/11/24 1315     Extra Tube Hold for add-ons.     Comment: Auto resulted.       Light Blue Top [[id number]] Collected: 03/11/24 1207    Specimen: Blood Updated: 03/11/24 1315     Extra Tube Hold for add-ons.     Comment: Auto resulted       POC Glucose Once [[id number]]  (Abnormal) Collected: 03/11/24 1253    Specimen: Blood Updated: 03/11/24 1255     Glucose 118 mg/dL      Comment: Serial Number: [id number]:  [id number]       Protime-INR [[id number]]  (Abnormal) Collected: 03/11/24 1207    Specimen: Blood Updated: 03/11/24 1255     Protime 11.8 Seconds      INR 1.09    aPTT [[id number]]  (Abnormal) Collected: 03/11/24 1207    Specimen: Blood Updated: 03/11/24 1255     PTT <20.0 seconds     Comprehensive Metabolic Panel [[id number]]  (Abnormal) Collected: 03/11/24 1207    Specimen: Blood Updated: 03/11/24 1245     Glucose 112 mg/dL      BUN 17 mg/dL      Creatinine 0.79 mg/dL      Sodium 140 mmol/L      Potassium 4.3 mmol/L      Comment: Slight hemolysis detected by analyzer. Result may be falsely elevated.        Chloride 103 mmol/L      CO2 25.0 mmol/L      Calcium 9.8 mg/dL      Total Protein 6.8 g/dL      Albumin 4.2 g/dL      ALT (SGPT) 17 U/L      AST (SGOT) 16 U/L      Alkaline Phosphatase 59 U/L      Total Bilirubin 0.2 mg/dL      Globulin 2.6 gm/dL      A/G Ratio 1.6 g/dL      BUN/Creatinine Ratio 21.5     Anion Gap 12.0 mmol/L      eGFR 82.1 mL/min/1.73     Narrative:      GFR Normal >60  Chronic Kidney Disease <60  Kidney Failure <15      Single High Sensitivity Troponin T [[id number]]  (Normal) Collected: 03/11/24 1207    Specimen: Blood Updated: 03/11/24 1249      HS Troponin T 9 ng/L     Narrative:      High Sensitive Troponin T Reference Range:  <14.0 ng/L- Negative Female for AMI  <22.0 ng/L- Negative Male for AMI  >=14 - Abnormal Female indicating possible myocardial injury.  >=22 - Abnormal Male indicating possible myocardial injury.   Clinicians would have to utilize clinical acumen, EKG, Troponin, and serial changes to determine if it is an Acute Myocardial Infarction or myocardial injury due to an underlying chronic condition.         CBC & Differential [[id number]]  (Normal) Collected: 03/11/24 1207    Specimen: Blood Updated: 03/11/24 1224    Narrative:      The following orders were created for panel order CBC & Differential.  Procedure                               Abnormality         Status                     ---------                               -----------         ------                     CBC Auto Differential[[id number]]        Normal              Final result                 Please view results for these tests on the individual orders.    CBC Auto Differential [[id number]]  (Normal) Collected: 03/11/24 1207    Specimen: Blood Updated: 03/11/24 1224     WBC 6.40 10*3/mm3      RBC 4.69 10*6/mm3      Hemoglobin 13.4 g/dL      Hematocrit 40.5 %      MCV 86.5 fL      MCH 28.5 pg      MCHC 33.0 g/dL      RDW 14.2 %      RDW-SD 45.5 fl      MPV 9.8 fL      Platelets 189 10*3/mm3      Neutrophil % 70.8 %      Lymphocyte % 20.1 %      Monocyte % 6.4 %      Eosinophil % 1.9 %      Basophil % 0.8 %      Neutrophils, Absolute 4.50 10*3/mm3      Lymphocytes, Absolute 1.30 10*3/mm3      Monocytes, Absolute 0.40 10*3/mm3      Eosinophils, Absolute 0.10 10*3/mm3      Basophils, Absolute 0.00 10*3/mm3      nRBC 0.1 /100 WBC                   Condition on Discharge:  Stable    Vital Signs  Temp:  [98 °F (36.7 °C)-98.3 °F (36.8 °C)] 98 °F (36.7 °C)  Heart Rate:  [66-89] 66  Resp:  [18-20] 19  BP: ()/(70-91) 124/81      Physical Exam  Vitals reviewed.    Constitutional:       Appearance: She is not ill-appearing.   HENT:      Head: Normocephalic and atraumatic.      Right Ear: External ear normal.      Left Ear: External ear normal.      Nose: Nose normal.      Mouth/Throat:      Mouth: Mucous membranes are moist.   Eyes:      General:         Right eye: No discharge.         Left eye: No discharge.   Cardiovascular:      Rate and Rhythm: Normal rate and regular rhythm.      Pulses: Normal pulses.      Heart sounds: Normal heart sounds.   Pulmonary:      Effort: Pulmonary effort is normal.      Breath sounds: Normal breath sounds.   Abdominal:      General: Bowel sounds are normal.      Palpations: Abdomen is soft.   Musculoskeletal:         General: Normal range of motion.      Cervical back: Normal range of motion.   Skin:     General: Skin is warm.   Neurological:      Mental Status: She is alert and oriented to person, place, and time.   Psychiatric:         Behavior: Behavior normal.              Discharge Disposition  Home-Health Care [facility]    Discharge Medications     Discharge Medications        New Medications        Instructions Start Date   meclizine 25 MG tablet  Commonly known as: ANTIVERT   25 mg, Oral, 3 Times Daily PRN             Changes to Medications        Instructions Start Date   spironolactone 50 MG tablet  Commonly known as: ALDACTONE  What changed: when to take this   50 mg, Oral, Daily             Continue These Medications        Instructions Start Date   carboxymethylcellulose sod 1 % gel eye gel   1 drop, Both Eyes, 3 Times Daily PRN      docusate sodium 50 MG capsule  Commonly known as: COLACE   Take 1 capsule by mouth Daily As Needed for Constipation.      escitalopram 20 MG tablet  Commonly known as: LEXAPRO   20 mg, Oral, Daily               Discharge Diet:   Diet Instructions       Diet: Regular/House Diet; Regular (IDDSI 7); Thin (IDDSI 0)      Discharge Diet: Regular/House Diet    Texture: Regular (IDDSI 7)    Fluid Consistency:  Thin (IDDSI 0)            Activity at Discharge:   Activity Instructions       Gradually Increase Activity Until at Pre-Hospitalization Level              Follow-up Appointments  Future Appointments   Date Time Provider Department Center   9/11/2024  1:00 PM Blank Barrientos APRN [facility]     Additional Instructions for the Follow-ups that You Need to Schedule       Ambulatory Referral to Physical Therapy Evaluate and treat; Strengthening   As directed      Specialty needed: Evaluate and treat   Exercises: Strengthening   Follow-up needed: Yes        Discharge Follow-up with PCP   As directed       Currently Documented PCP:    Ginny Parker MD    PCP Phone Number:    [phone]     Follow Up Details: keep current appointment                Test Results Pending at Discharge       FRANNY Harper  03/12/24  10:20 EDT    Time: Discharge 25 min

## 2024-03-12 NOTE — SIGNIFICANT NOTE
03/12/24 1541   OTHER   Discipline physical therapist   Rehab Time/Intention   Session Not Performed other (see comments)  (hospital d/c pending)   Recommendation   PT - Next Appointment 03/13/24

## 2024-03-13 NOTE — CASE MANAGEMENT/SOCIAL WORK
Case Management Discharge Note      Final Note: Home with recommendation of outpt Vestibular rehab                 Transportation Services  Private: Car    Final Discharge Disposition Code: 01 - home or self-care

## 2024-07-24 DIAGNOSIS — I71.21 ANEURYSM OF ASCENDING AORTA WITHOUT RUPTURE: Primary | ICD-10-CM

## 2024-08-14 ENCOUNTER — HOSPITAL ENCOUNTER (OUTPATIENT)
Dept: CT IMAGING | Facility: HOSPITAL | Age: 69
Discharge: HOME | End: 2024-08-14
Admitting: NURSE PRACTITIONER
Payer: MEDICARE

## 2024-08-14 DIAGNOSIS — I71.21 ANEURYSM OF ASCENDING AORTA WITHOUT RUPTURE: ICD-10-CM

## 2024-08-14 PROCEDURE — 71250 CT THORAX DX C-: CPT

## 2024-10-03 NOTE — PROGRESS NOTES
10/4/2024      Subjective:      Ginny Parker MD    Chief Complaint: Follow-up of thoracic aneurysm    History of Present Illness:       Dear Ginny Levy MD and Colleagues,    It was nice to see Sue Chen in [facility] in follow-up. She is a 68 y.o. female with a known ascending aortic aneurysm, known family hx of ascending aortic aneurysm, LILI with CPAP compliance, and former tobacco abuse (quit 40+ yrs ago).  Her ascending aortic aneurysm was originally discovered during a coronary calcium screening scan in January 2024.  Her calcium score was zero.  Since last being seen, she had left shoulder surgery and is recovering nicely from it.  She comes in today for routine 6-month follow-up for aneurysm surveillence.  The CT chest without contrast on 8/14/2024 was reviewed.  The aortic root measures 3.4 cm,  ascending aorta measures 4.5-4.6 cm, and DTA measures 2.9-3 cm.  These measurements are stable.  She denies shortness of breath, chest/back pain, numbness/tingling/pain of extremities.  No vascular deficiencies or hyperextensible or hypermobile joints are noted on exam.  The patient's family history is positive for thoracic aortic aneurysms.  Her sister is seen by our office as well.  Family hx is negative for aortic dissections, negative for connective tissue disease, and negative for coronary disease in first degree family members.  Her BP today is 136/87.  She is alone for the appt today.        Patient Active Problem List   Diagnosis    Aneurysm of ascending aorta without rupture    Elevated blood pressure reading    LILI on CPAP    Family history of thoracic aortic aneurysm    Dizziness       Past Medical History:   Diagnosis Date    Arthritis     Cancer     Sleep apnea        Past Surgical History:   Procedure Laterality Date    BILATERAL BREAST REDUCTION Bilateral     CHEILECTOMY Left 02/05/2024    Procedure: HALLUX RIGIDUS CORRECTION LEFT;  Surgeon: Yann Troncoso DPM;  [place] [facility] [place];  Service: Podiatry;  Laterality: Left;    CHOLECYSTECTOMY      HYSTERECTOMY      SKIN CANCER EXCISION      on face    TONSILLECTOMY      TOTAL ABDOMINAL HYSTERECTOMY WITH SALPINGO OOPHORECTOMY Bilateral 2004    TUBAL ABDOMINAL LIGATION         Allergies   Allergen Reactions    Codeine Nausea Only         Current Outpatient Medications:     carboxymethylcellulose sod 1 % gel eye gel, Administer 1 drop to both eyes 3 (Three) Times a Day As Needed (Dry eyes)., Disp: , Rfl:     docusate sodium (COLACE) 50 MG capsule, Take 1 capsule by mouth Daily As Needed for Constipation., Disp: , Rfl:     escitalopram (LEXAPRO) 20 MG tablet, Take 1 tablet by mouth Daily., Disp: , Rfl:     spironolactone (ALDACTONE) 50 MG tablet, Take 1 tablet by mouth Daily., Disp: 30 tablet, Rfl: 1    Social History     Socioeconomic History    Marital status:    Tobacco Use    Smoking status: Former     Current packs/day: 0.00     Average packs/day: 1 pack/day for 12.0 years (12.0 ttl pk-yrs)     Types: Cigarettes     Start date:      Quit date:      Years since quittin.7    Smokeless tobacco: Never   Vaping Use    Vaping status: Never Used   Substance and Sexual Activity    Alcohol use: Yes     Comment: socially    Drug use: Never       History reviewed. No pertinent family history.        Review of Systems:  Review of Systems   Respiratory:  Negative for shortness of breath.    Cardiovascular:  Negative for chest pain, palpitations and leg swelling.   Musculoskeletal:  Positive for arthralgias.   Neurological:  Negative for dizziness and light-headedness.       Physical Exam:    Vital Signs:  Weight: 76.1 kg (167 lb 12.8 oz)   Body mass index is 27.92 kg/m².  Temp: 97.3 °F (36.3 °C)   Heart Rate: 77   BP: 136/87     Physical Exam  Vitals and nursing note reviewed.   Constitutional:       General: She is awake.      Appearance: Normal appearance. She is well-developed and well-groomed.   HENT:      Head: Normocephalic  and atraumatic.      Nose: Nose normal.      Mouth/Throat:      Lips: Pink.      Mouth: Mucous membranes are moist.      Pharynx: Uvula midline.   Eyes:      General: Lids are normal. No scleral icterus.     Extraocular Movements: Extraocular movements intact.      Conjunctiva/sclera: Conjunctivae normal.      Pupils: Pupils are equal, round, and reactive to light.   Neck:      Thyroid: No thyroid mass or thyromegaly.      Vascular: Normal carotid pulses. No carotid bruit, hepatojugular reflux or JVD.      Trachea: Trachea normal.   Cardiovascular:      Rate and Rhythm: Normal rate and regular rhythm.      Pulses:           Carotid pulses are 2+ on the right side and 2+ on the left side.       Radial pulses are 2+ on the right side and 2+ on the left side.        Femoral pulses are 2+ on the right side and 2+ on the left side.       Popliteal pulses are 2+ on the right side and 2+ on the left side.        Dorsalis pedis pulses are 2+ on the right side and 2+ on the left side.        Posterior tibial pulses are 2+ on the right side and 2+ on the left side.      Heart sounds: Normal heart sounds. No murmur heard.  Pulmonary:      Effort: Pulmonary effort is normal.      Breath sounds: Normal breath sounds.   Abdominal:      General: Abdomen is protuberant. Bowel sounds are normal. There is no distension.      Palpations: Abdomen is soft.      Tenderness: There is no abdominal tenderness.   Musculoskeletal:      Cervical back: Neck supple.      Right lower leg: No edema.      Left lower leg: No edema.      Comments: Gait steady and strong without use of assistive devices   Lymphadenopathy:      Cervical: No cervical adenopathy.      Upper Body:      Right upper body: No supraclavicular adenopathy.      Left upper body: No supraclavicular adenopathy.   Skin:     General: Skin is warm and dry.      Capillary Refill: Capillary refill takes less than 2 seconds.      Findings: No erythema or rash.      Nails: There is no  clubbing.   Neurological:      Mental Status: She is alert and oriented to person, place, and time.      GCS: GCS eye subscore is 4. GCS verbal subscore is 5. GCS motor subscore is 6.   Psychiatric:         Attention and Perception: Attention and perception normal.         Mood and Affect: Mood and affect normal.         Speech: Speech normal.         Behavior: Behavior normal. Behavior is cooperative.         Thought Content: Thought content normal.         Cognition and Memory: Cognition and memory normal.         Judgment: Judgment normal.          Assessment:     Ascending aortic aneurysm, 4.6 cm--stable  Family hx of thoracic aneurysm--sister  Former tobacco abuse  LILI with CPAP compliance    Recommendation/Plan:       Continued risk factor modification of hypertension with a goal blood pressure less than 130/80, hyperlipidemia optimization, and continued avoidance of tobacco/nicotine products.    We discussed the importance of avoidance of over the counter decongestants and stimulants, specifically pseudoephedrine, for hypertension and aneurysm management.    Initiation of low aerobic exercise is indicated to help reduce body habitus, assist with blood pressure and cholesterol control.       Although risk of rupture is low, emergency department presentation is warranted for acute chest, back, or abdominal pain, syncope, or stroke like symptoms.    Follow up in [facility] in 1 year with CT scan of chest without contrast and an echocardiogram.  Her aortic measurements are stable.  She continues to be physically active.    I spent approximately 30 minutes total in evaluating the data, examining the patient, discussing the options and counseling.  Independent interpretation of imaging.  Imaging shown to pt/daughter.     Thank you for allowing us to participate in her care.    Regards,    FRANNY Recinos

## 2024-10-04 ENCOUNTER — OFFICE VISIT (OUTPATIENT)
Dept: CARDIAC SURGERY | Facility: CLINIC | Age: 69
End: 2024-10-04
Payer: MEDICARE

## 2024-10-04 VITALS
TEMPERATURE: 97.3 F | WEIGHT: 167.8 LBS | DIASTOLIC BLOOD PRESSURE: 87 MMHG | OXYGEN SATURATION: 98 % | HEART RATE: 77 BPM | SYSTOLIC BLOOD PRESSURE: 136 MMHG | BODY MASS INDEX: 27.96 KG/M2 | HEIGHT: 65 IN | RESPIRATION RATE: 20 BRPM

## 2024-10-04 DIAGNOSIS — Z82.49 FAMILY HISTORY OF THORACIC AORTIC ANEURYSM: ICD-10-CM

## 2024-10-04 DIAGNOSIS — I71.21 ANEURYSM OF ASCENDING AORTA WITHOUT RUPTURE: Primary | ICD-10-CM

## 2024-10-04 PROCEDURE — 1159F MED LIST DOCD IN RCRD: CPT | Performed by: NURSE PRACTITIONER

## 2024-10-04 PROCEDURE — 99214 OFFICE O/P EST MOD 30 MIN: CPT | Performed by: NURSE PRACTITIONER

## 2024-10-04 PROCEDURE — 1160F RVW MEDS BY RX/DR IN RCRD: CPT | Performed by: NURSE PRACTITIONER

## (undated) DEVICE — BLD SAW SAG THN 9X0.38X25MM

## (undated) DEVICE — BNDG GZ SOF-FORM CONFRM 3X75IN LF STRL

## (undated) DEVICE — APPL CHLORAPREP HI/LITE 26ML ORNG

## (undated) DEVICE — TBG PENCL TELESCP MEGADYNE SMOKE EVAC 10FT

## (undated) DEVICE — DRP SURG 3/4 REINF 53X77IN STRL

## (undated) DEVICE — SUT VIC PLSTC UD PS2 4/0 27IN J426

## (undated) DEVICE — PK MINOR PROCEDURE 46

## (undated) DEVICE — SYR LL TP 10ML STRL

## (undated) DEVICE — GLV SURG BIOGEL LTX PF 7 1/2

## (undated) DEVICE — DRSNG WND GZ CURAD OIL EMULSION 3X3IN STRL

## (undated) DEVICE — BNDG ELAS SLF ADHR 4IN 5YD LTX

## (undated) DEVICE — SUT VIC 3/0 SH 27IN J416H

## (undated) DEVICE — IRRIGATOR BULB ASEPTO 60CC STRL

## (undated) DEVICE — BNDG ESMARK 4IN 12FT LF STRL BLU

## (undated) DEVICE — Device

## (undated) DEVICE — TOWL STRL OR PREWSH COTN 17X27IN BLU DISP STRL PK/4

## (undated) DEVICE — BNDG RL KERLIX 4.5X4.1YD

## (undated) DEVICE — PAD GRND E/S UNIV SPLT W/CORD DISP

## (undated) DEVICE — BLD SCLPL RIBBACK C/SS SZ15

## (undated) DEVICE — SUT PROLN 5/0 PC3 BL 18IN 8635G

## (undated) DEVICE — SPNG GZ WOVN 4X4IN 12PLY 10/BX STRL

## (undated) DEVICE — NDL HYPO PRECISIONGLIDE REG 25G 1 1/2